# Patient Record
Sex: FEMALE | Race: WHITE | Employment: FULL TIME | URBAN - METROPOLITAN AREA
[De-identification: names, ages, dates, MRNs, and addresses within clinical notes are randomized per-mention and may not be internally consistent; named-entity substitution may affect disease eponyms.]

---

## 2017-02-08 ENCOUNTER — HOSPITAL ENCOUNTER (OUTPATIENT)
Dept: OBGYN | Age: 27
Discharge: HOME | End: 2017-02-28
Attending: OBSTETRICS & GYNECOLOGY | Admitting: OBSTETRICS & GYNECOLOGY

## 2017-03-01 ENCOUNTER — HOSPITAL ENCOUNTER (OUTPATIENT)
Dept: OTHER | Age: 27
Discharge: HOME | End: 2017-03-31

## 2017-03-13 ENCOUNTER — HOSPITAL ENCOUNTER (OUTPATIENT)
Dept: OBGYN | Age: 27
Discharge: HOME | End: 2017-03-31
Admitting: OBSTETRICS & GYNECOLOGY

## 2017-03-28 PROBLEM — O47.9 THREATENED LABOR AT TERM: Status: ACTIVE | Noted: 2017-03-28

## 2017-09-25 ENCOUNTER — OFFICE VISIT (OUTPATIENT)
Dept: BARIATRICS/WEIGHT MGMT | Age: 27
End: 2017-09-25

## 2017-09-25 VITALS
HEART RATE: 76 BPM | HEIGHT: 61 IN | DIASTOLIC BLOOD PRESSURE: 82 MMHG | BODY MASS INDEX: 38.71 KG/M2 | SYSTOLIC BLOOD PRESSURE: 114 MMHG | WEIGHT: 205 LBS

## 2017-09-25 DIAGNOSIS — E66.01 MORBID OBESITY WITH BMI OF 40.0-44.9, ADULT (HCC): Primary | ICD-10-CM

## 2017-09-25 DIAGNOSIS — Z79.899 HIGH RISK MEDICATIONS (NOT ANTICOAGULANTS) LONG-TERM USE: ICD-10-CM

## 2017-09-25 PROCEDURE — 99214 OFFICE O/P EST MOD 30 MIN: CPT | Performed by: FAMILY MEDICINE

## 2017-09-25 PROCEDURE — 93000 ELECTROCARDIOGRAM COMPLETE: CPT | Performed by: FAMILY MEDICINE

## 2017-09-25 RX ORDER — MEDROXYPROGESTERONE ACETATE 10 MG/1
TABLET ORAL
COMMUNITY
Start: 2017-09-06 | End: 2017-09-25

## 2017-09-25 RX ORDER — FLUCONAZOLE 150 MG/1
TABLET ORAL
COMMUNITY
Start: 2017-06-30 | End: 2017-09-25

## 2017-09-25 RX ORDER — VITAMIN A ACETATE, .BETA.-CAROTENE, ASCORBIC ACID, CHOLECALCIFEROL, .ALPHA.-TOCOPHEROL ACETATE, DL-, THIAMINE MONONITRATE, RIBOFLAVIN, NIACINAMIDE, PYRIDOXINE HYDROCHLORIDE, FOLIC ACID, CYANOCOBALAMIN, CALCIUM CARBONATE, FERROUS FUMARATE, ZINC OXIDE, AND CUPRIC OXIDE 2000; 2000; 120; 400; 22; 1.84; 3; 20; 10; 1; 12; 200; 27; 25; 2 [IU]/1; [IU]/1; MG/1; [IU]/1; MG/1; MG/1; MG/1; MG/1; MG/1; MG/1; UG/1; MG/1; MG/1; MG/1; MG/1
TABLET ORAL
COMMUNITY
Start: 2017-07-13

## 2017-09-25 ASSESSMENT — PATIENT HEALTH QUESTIONNAIRE - PHQ9
1. LITTLE INTEREST OR PLEASURE IN DOING THINGS: 0
SUM OF ALL RESPONSES TO PHQ9 QUESTIONS 1 & 2: 0
2. FEELING DOWN, DEPRESSED OR HOPELESS: 0
SUM OF ALL RESPONSES TO PHQ QUESTIONS 1-9: 0

## 2017-09-25 ASSESSMENT — ENCOUNTER SYMPTOMS
GASTROINTESTINAL NEGATIVE: 1
RESPIRATORY NEGATIVE: 1
EYES NEGATIVE: 1

## 2017-10-05 ENCOUNTER — OFFICE VISIT (OUTPATIENT)
Dept: BARIATRICS/WEIGHT MGMT | Age: 27
End: 2017-10-05

## 2017-10-05 VITALS
DIASTOLIC BLOOD PRESSURE: 80 MMHG | SYSTOLIC BLOOD PRESSURE: 116 MMHG | WEIGHT: 203 LBS | BODY MASS INDEX: 38.33 KG/M2 | HEIGHT: 61 IN | HEART RATE: 80 BPM | RESPIRATION RATE: 16 BRPM

## 2017-10-05 DIAGNOSIS — E55.9 VITAMIN D DEFICIENCY: ICD-10-CM

## 2017-10-05 DIAGNOSIS — E78.2 MIXED HYPERLIPIDEMIA: ICD-10-CM

## 2017-10-05 DIAGNOSIS — E66.9 OBESITY (BMI 30-39.9): Primary | ICD-10-CM

## 2017-10-05 PROCEDURE — 99213 OFFICE O/P EST LOW 20 MIN: CPT | Performed by: NURSE PRACTITIONER

## 2017-10-05 ASSESSMENT — ENCOUNTER SYMPTOMS
GASTROINTESTINAL NEGATIVE: 1
RESPIRATORY NEGATIVE: 1
EYES NEGATIVE: 1

## 2017-10-05 NOTE — PROGRESS NOTES
[facility]) Physicians   [facility] [employer]    Patient: Luis Clancy                      Encounter Date: 10/5/2017    YOB: 1990               Age: 32 y.o. Chief Complaint   Patient presents with    Obesity     13th MWM, restart        /80  Pulse 80  Resp 16  Ht 5' 1\" (1.549 m)  Wt 203 lb (92.1 kg)  LMP 09/19/2017  Breastfeeding? No  BMI 38.36 kg/m2    Body mass index is 38.36 kg/(m^2). HPI: 32 y.o. female with a long-standing history of obesity presents today for follow-up. she has lost 2 pounds since her last visit . Current treatment includes 1200 calorie LCHF diet and exercise. Met with the dietitian today. Plan reviewed in detail with the patient. She was previously on Qsymia (over a year ago) and is interested in starting this again. She did have a baby 6 months ago, but is no longer breastfeeding. Diet: [x]LCHF/Ketogenic   []Modified low-calorie diet  [x]Low-calorie diet          []Maintenance       []Other:         Adherent? [x]Yes     []No     [x]Patient denies history/family history of thyroid cancer  [x]Patient denies history of heart disease  [x]Patient denies history of glaucoma  [x]Patient denies recent MAOI use  [x]Patient denies being currently pregnant or breastfeeding. Patient understands it is her responsibility to obtain negative pregnancy tests on a monthly basis.   [x] Patient denies use of weight loss medications within the past 6 months  [x] BMI greater than 30, or greater than 27 with co-morbidities    [x] OARRS reviewed prior to initiating anti-obesity medication   [x]EKG reviewed by MD prior to initiation of anti-obesity medication      Exercise: [x]Cardio - walking at lunch and joined cardio class at work    []Resistance/strength training     []Other    Allergies   Allergen Reactions    Penicillins      Unsure of reaction to pcn    Eggs Or Egg-Derived Products Rash     03/20/2017 -  02/09/2017 -  11/09/2016 -  09/12/2016 -      Lac Bovis Rash     03/20/2017 -  02/09/2017 -  11/09/2016 -  09/12/2016 -      Peanut-Containing Drug Products Rash     03/20/2017 -  02/09/2017 -  11/09/2016 -  09/12/2016 -           Current Outpatient Prescriptions:     Prenatal Vit-Fe Fumarate-FA (PNV PRENATAL PLUS MULTIVITAMIN) 27-1 MG TABS, , Disp: , Rfl:     Patient Active Problem List   Diagnosis    Screening for malignant neoplasm of cervix    Vaginal discharge    Pelvic pain in female    PCOS (polycystic ovarian syndrome)    Obesity    Obesity (BMI 30-39. 9)    Threatened labor at term    Forceps delivery    Vitamin D deficiency       Review of Systems   Constitutional: Negative. HENT: Negative. Eyes: Negative. Respiratory: Negative. Cardiovascular: Negative. Gastrointestinal: Negative. Skin: Negative. Neurological: Negative. Physical Exam   Constitutional: She is oriented to person, place, and time. She appears well-developed and well-nourished. HENT:   Head: Normocephalic and atraumatic. Eyes: Pupils are equal, round, and reactive to light. Neck: Normal range of motion. Cardiovascular: Normal rate, regular rhythm and normal heart sounds. Pulmonary/Chest: Effort normal.   Musculoskeletal: Normal range of motion. Neurological: She is alert and oriented to person, place, and time. Psychiatric: She has a normal mood and affect. Her behavior is normal. Judgment and thought content normal.         Assessment and Plan:    Jack Hurtado is following 1200 calorie LCF meal plan with no side effects. We discussed treatment options and she elects to start Qsymia today. We will start her on the Qsymia trial dose today. She will take daily in the morning with a large glass of water. She will follow up in 2 weeks. If she tolerates the trial dose well, we will increase to therapeutic dose at 2 week follow up. She was advised to contact us if any side effects occur.  We will also sign her up for the [facility] today to start the

## 2017-10-19 ENCOUNTER — OFFICE VISIT (OUTPATIENT)
Dept: BARIATRICS/WEIGHT MGMT | Age: 27
End: 2017-10-19

## 2017-10-19 VITALS
WEIGHT: 202 LBS | SYSTOLIC BLOOD PRESSURE: 115 MMHG | HEART RATE: 77 BPM | DIASTOLIC BLOOD PRESSURE: 79 MMHG | BODY MASS INDEX: 38.14 KG/M2 | HEIGHT: 61 IN

## 2017-10-19 DIAGNOSIS — E66.9 OBESITY (BMI 30-39.9): Primary | ICD-10-CM

## 2017-10-19 PROCEDURE — 99213 OFFICE O/P EST LOW 20 MIN: CPT | Performed by: NURSE PRACTITIONER

## 2017-10-19 ASSESSMENT — ENCOUNTER SYMPTOMS
EYES NEGATIVE: 1
RESPIRATORY NEGATIVE: 1
GASTROINTESTINAL NEGATIVE: 1

## 2017-10-19 NOTE — PATIENT INSTRUCTIONS
Key Low Carb, High Healthy Fat Dietary Points:    - Meats (preferably organic or grass fed) are great sources of protein and are low in carbohydrates. [place] with coconut, olive, avocado, or almond oils. - When buying dairy, choose regular or full fat options. - Choose vegetables that grow above ground as they are generally lower in carbohydrates. - Avoid bread, rice, potatoes, pasta and all sources of simple sugars (desserts, soda, breakfast cereals). - Choose beverages that are calorie and sugar free, such as water or flavored ambriz. - When eating fruit, choose berries as a snack option a few times a week. Reminder regarding weight loss medications: You must be seen in office every 2-4 weeks to have your prescriptions refilled. If you are off of your medication for longer than 7 days, you will not be able to restart the medication for at least 6 months. Always call our office to report any side effects you may be having. Females, it is your responsibility to obtain negative pregnancy tests each month.

## 2017-11-13 ENCOUNTER — OFFICE VISIT (OUTPATIENT)
Dept: BARIATRICS/WEIGHT MGMT | Age: 27
End: 2017-11-13

## 2017-11-13 VITALS
WEIGHT: 197 LBS | HEIGHT: 61 IN | HEART RATE: 84 BPM | SYSTOLIC BLOOD PRESSURE: 118 MMHG | DIASTOLIC BLOOD PRESSURE: 80 MMHG | BODY MASS INDEX: 37.19 KG/M2

## 2017-11-13 DIAGNOSIS — Z71.3 DIETARY COUNSELING AND SURVEILLANCE: ICD-10-CM

## 2017-11-13 DIAGNOSIS — E66.9 CLASS 2 OBESITY: Primary | ICD-10-CM

## 2017-11-13 PROCEDURE — 99213 OFFICE O/P EST LOW 20 MIN: CPT | Performed by: FAMILY MEDICINE

## 2017-11-13 ASSESSMENT — ENCOUNTER SYMPTOMS
GASTROINTESTINAL NEGATIVE: 1
EYES NEGATIVE: 1
RESPIRATORY NEGATIVE: 1

## 2017-11-13 NOTE — PATIENT INSTRUCTIONS
Patient Education        Learning About Obesity  What is obesity? Obesity means having so much body fat that your health is in danger. Having too much body fat can lead to type 2 diabetes, heart disease, high blood pressure, arthritis, sleep apnea, and stroke. Even if you don't feel bad now, think about these health risks. Do they seem like a good reason to start on a new path toward a healthier weight? Or do you have another personal, powerful reason for wanting to lose weight? Whatever it is, keep it in mind. It can be hard to change eating habits and exercise habits. But with your own reason and plan, you can do it. How do you know if your weight is in the obesity range? To know if your weight is in the obesity range, your doctor looks at your body mass index (BMI) and waist size. Your BMI is a number that is calculated from your weight and your height. To figure your BMI for yourself, get a BMI table from your doctor or use an online tool, such as http://www.flores.com/ on the [employer]. What causes obesity? When you take in more calories than you burn off, you gain weight. How you eat, how active you are, and other things affect how your body uses calories and whether you gain weight. If you have family members who have too much body fat, you may have inherited a tendency to gain weight. And your family also helps form your eating and lifestyle habits, which can lead to obesity. Also, our busy lives make it harder to plan and cook healthy meals. For many of us, it's easier to reach for prepared foods, go out to eat, or go to the drive-through. But these foods are often high in saturated fat and calories. Portions are often too large. What can you do to reach a healthy weight? Focus on health, not diets. Diets are hard to stay on and don't work in the long run.  It is very hard to stay with a diet that includes lots of big changes in your eating habits. Instead of a diet, focus on lifestyle changes that will improve your health and achieve the right balance of energy and calories. To lose weight, you need to burn more calories than you take in. You can do it by eating healthy foods in reasonable amounts and becoming more active, even a little bit every day. Making small changes over time can add up to a lot. Make a plan for change. Many people have found that naming their reasons for change and staying focused on their plan can make a big difference. Work with your doctor to create a plan that is right for you. · Ask yourself: Dat Ohms are my personal, most powerful reasons for wanting this change? What will my life look like when I've made the change? \"  · Set your long-term goal. Make it specific, such as \"I will lose x pounds. \"  · Break your long-term goal into smaller, short-term goals. Make these small steps specific and within your reach, things you know you can do. These steps are what keep you going from day to day. How can you stay on your plan for change? Be ready. Choose to start during a time when there are few events that might trigger slip-ups, like holidays, social events, and high-stress periods. Decide on your first few steps. Most people have more success when they make small changes, one step at a time. For example, you might switch a daily candy bar to a piece of fruit, walk 10 minutes more, or add more vegetables to a meal.  Line up your support people. Make sure you're not going to be alone as you make this change. Connect with people who understand how important it is to you. Ask family members and friends for help in keeping with your plan. And think about who could make it harder for you, and how to handle them. Try tracking. People who keep track of what they eat, feel, and do are better at losing weight. Try writing down things like:  · What and how much you eat.   · How you feel before and after each meal.  · Details about each meal (like eating out or at home, eating alone, or with friends or family). · What you do to be active. Look and plan. As you track, look for patterns that you may want to change. Take note of:  · When you eat and whether you skip meals. · How often you eat out. · How many fruits and vegetables you eat. · When you eat beyond feeling full. · When and why you eat for reasons other than being hungry. When you stray from your plan, don't get upset. Figure out what made you slip up and how you can fix it. Can you take medicines or have surgery to lose weight? Before your doctor will prescribe medicines or surgery, he or she will probably want you to be more active and follow your healthy eating plan for a period of time. These habits are key lifelong changes for managing your weight, with or without other medical treatment. And these changes can help you avoid weight-related health problems. Follow-up care is a key part of your treatment and safety. Be sure to make and go to all appointments, and call your doctor if you are having problems. It's also a good idea to know your test results and keep a list of the medicines you take. Where can you learn more? Go to https://SOL ELIXIRSpeNamshi.Stylyt. org and sign in to your [employer] account. Enter N111 in the [employer] box to learn more about \"Learning About Obesity. \"     If you do not have an account, please click on the \"Sign Up Now\" link. Current as of: October 13, 2016  Content Version: 11.3  © 5152-6971 [employer]. Care instructions adapted under license by [facility] ([facility]). If you have questions about a medical condition or this instruction, always ask your healthcare professional. [address] any warranty or liability for your use of this information.

## 2017-11-13 NOTE — PROGRESS NOTES
pain in female    PCOS (polycystic ovarian syndrome)    Obesity    Obesity (BMI 30-39. 9)    Threatened labor at term    Forceps delivery    Vitamin D deficiency    Mixed hyperlipidemia       Review of Systems   HENT: Negative. Eyes: Negative. Respiratory: Negative. Cardiovascular: Negative. Gastrointestinal: Negative. Endocrine: Negative. Musculoskeletal: Negative. Neurological: Negative. Psychiatric/Behavioral: Negative. Physical Exam   Constitutional: She is oriented to person, place, and time. She appears well-developed and well-nourished. Eyes: Conjunctivae and EOM are normal.   Cardiovascular: Normal rate and normal heart sounds. Exam reveals no gallop and no friction rub. No murmur heard. Pulmonary/Chest: Effort normal and breath sounds normal. No respiratory distress. She has no wheezes. She has no rales. Musculoskeletal: She exhibits no edema. Neurological: She is alert and oriented to person, place, and time. Skin: Skin is warm and dry. Psychiatric: She has a normal mood and affect.  Her behavior is normal. Judgment and thought content normal.       Admission on 03/28/2017, Discharged on 03/30/2017   Component Date Value Ref Range Status    WBC 03/28/2017 13.4* 4.0 - 11.0 K/uL Final    RBC 03/28/2017 4.18  4.00 - 5.20 M/uL Final    Hemoglobin 03/28/2017 12.6  12.0 - 16.0 g/dL Final    Hematocrit 03/28/2017 37.2  36.0 - 48.0 % Final    MCV 03/28/2017 89.0  80.0 - 100.0 fL Final    MCH 03/28/2017 30.2  26.0 - 34.0 pg Final    MCHC 03/28/2017 33.9  31.0 - 36.0 g/dL Final    RDW 03/28/2017 15.5* 12.4 - 15.4 % Final    Platelets 57/79/8180 301  135 - 450 K/uL Final    MPV 03/28/2017 8.5  5.0 - 10.5 fL Final    Neutrophils % 03/28/2017 84.1  % Final    Lymphocytes % 03/28/2017 9.8  % Final    Monocytes % 03/28/2017 4.7  % Final    Eosinophils % 03/28/2017 0.8  % Final    Basophils % 03/28/2017 0.6  % Final    Neutrophils # 03/28/2017 11.2* 1.7 - 7.7 K/uL Final    Lymphocytes # 03/28/2017 1.3  1.0 - 5.1 K/uL Final    Monocytes # 03/28/2017 0.6  0.0 - 1.3 K/uL Final    Eosinophils # 03/28/2017 0.1  0.0 - 0.6 K/uL Final    Basophils # 03/28/2017 0.1  0.0 - 0.2 K/uL Final    ABO/Rh 03/28/2017 B POS   Final    Antibody Screen 03/28/2017 NEG   Final    RPR 03/29/2017 Non-reactive  Non-reactive Final    Amphetamine Screen, Urine 03/28/2017 Neg  Negative <1000ng/mL Final    Barbiturate Screen, Ur 03/28/2017 Neg  Negative <200 ng/mL Final    Benzodiazepine Screen, Urine 03/28/2017 Neg  Negative <200 ng/mL Final    Cannabinoid Scrn, Ur 03/28/2017 Neg  Negative <50 ng/mL Final    COCAINE METABOLITE SCREEN URINE 03/28/2017 Neg  Negative <300 ng/mL Final    Opiate Scrn, Ur 03/28/2017 Neg  Negative <300 ng/mL Final    Comment: \"Therapeutic levels of pain medication, especially oxycontin and synthetic  opioids, may not be detected by this Methodology. Pain management screen  panel  Drug panel-PM-Hi Res Ur, Interp (PAIN) should be considered for drug  monitoring \".  PCP Scrn, Ur 03/28/2017 Neg  Negative <25 ng/mL Final    Methadone Screen, Urine 03/28/2017 Neg  Negative <300 ng/mL Final    Propoxyphene Scrn, Ur 03/28/2017 Neg  Negative <300 ng/mL Final    pH, UA 03/28/2017 7.0   Final    Comment: Urine pH less than 5.0 or greater than 8.0 may indicate sample adulteration. Another sample should be collected if clinically  indicated.  Drug Screen Comment: 03/28/2017 see below   Final    Comment: This method is a screening test to detect only these drug  classes as part of a medical workup. Confirmatory testing  by another method should be ordered if clinically indicated.       Oxycodone Urine 03/28/2017 Neg  Negative <100 ng/ml Final    Antibody Screen 09/12/2016 negativre   Final    ABO/Rh 09/12/2016 B positive   Final    Hematocrit 09/12/2016 35.9* 36 - 46 % Final    HCT 12/22/2016 32.5  % Final    Hemoglobin 09/12/2016 10.9* 12.0 - 16.0 carbohydrate sources  [x] Alcohol use  [x] Tobacco use   [x] Drug use- Denies  [x] Importance of exercise and reducing sedentary time  [x] Treatment consent- Patient understands and agrees with the treatment plan   [x] Proper use of medication and side effects  [x] OARRS report 10/19/2017 1  QSYMIA 7.5 MG-46 MG CAPSULE 30 30 AN SAVITA   [x] Labs  [x] EKG     Patient denies any history of cardiovascular disease (e.g., CAD, stroke, arrhythmias, CHF, uncontrolled HTN), seizure disorder, MAOI use within the last 2 weeks, hyperthyroidism, glaucoma, agitated states, history of drug abuse, pregnancy, nursing, known hypersensitivity to the prescribing meds, history of pancreatitis, or personal or family history of thyroid medullary cancer. Treatment start date: 10/6/17  Starting Weight: 203 pounds   12 week goal: 10 pounds by 1/6/18  Total weight loss: 6 pounds    Key dietary points:    - Meats (preferably organic or grass fed) are great sources of protein and have no carbohydrates. - Recommend coconut, olive, avocado, or almond oils. - When buying dairy, choose regular or full fat options. - Choose vegetables that grow above ground as they are generally lower in carbohydrates and higher in fiber.  - Avoid starches such as bread, rice, potatoes, pasta and all sources of simple sugars (desserts, soda, breakfast cereals). - Choose beverages that are calorie and sugar free. Reminder regarding weight loss medications: You must be seen in office every 2-4 weeks to have your prescriptions refilled. If you are off of your medication for longer than 7 days, you will not be able to restart the medication for at least 6 months. Always call our office to report any side effects. Females, it is your responsibility to obtain negative pregnancy tests each month. No orders of the defined types were placed in this encounter. No Follow-up on file.         This dictation was performed with a verbal recognition program

## 2017-12-14 ENCOUNTER — OFFICE VISIT (OUTPATIENT)
Dept: BARIATRICS/WEIGHT MGMT | Age: 27
End: 2017-12-14

## 2017-12-14 VITALS
HEART RATE: 85 BPM | WEIGHT: 191 LBS | HEIGHT: 61 IN | BODY MASS INDEX: 36.06 KG/M2 | SYSTOLIC BLOOD PRESSURE: 111 MMHG | DIASTOLIC BLOOD PRESSURE: 70 MMHG

## 2017-12-14 DIAGNOSIS — E66.9 OBESITY (BMI 30-39.9): Primary | ICD-10-CM

## 2017-12-14 PROCEDURE — 99213 OFFICE O/P EST LOW 20 MIN: CPT | Performed by: NURSE PRACTITIONER

## 2017-12-14 ASSESSMENT — ENCOUNTER SYMPTOMS
GASTROINTESTINAL NEGATIVE: 1
RESPIRATORY NEGATIVE: 1
EYES NEGATIVE: 1

## 2017-12-14 NOTE — PATIENT INSTRUCTIONS
Key Low Carb, High Healthy Fat Dietary Points:    - Meats (preferably organic or grass fed) are great sources of protein and are low in carbohydrates. [place] with coconut, olive, avocado, or almond oils. - When buying dairy, choose regular or full fat options. - Choose vegetables that grow above ground as they are generally lower in carbohydrates. - Avoid bread, rice, potatoes, pasta and all sources of simple sugars (desserts, soda, breakfast cereals). - Choose beverages that are calorie and sugar free, such as water or flavored ambriz. - When eating fruit, choose berries as a snack option a few times a week. Reminder regarding weight loss medications: You must be seen in office every 2-4 weeks to have your prescriptions refilled. If you are off of your medication for longer than 7 days, you will not be able to restart the medication for at least 6 months. Always call our office to report any side effects you may be having. Females, it is your responsibility to obtain negative pregnancy tests each month.

## 2017-12-14 NOTE — PROGRESS NOTES
11/15/17  [x]EKG reviewed by MD prior to initiation of anti-obesity medication      Exercise: [x]Cardio- Began 2 times a week for 15 minutes. []Resistance/strength training     []Walking    [] None    Allergies   Allergen Reactions    Penicillins      Unsure of reaction to pcn    Eggs Or Egg-Derived Products Rash     03/20/2017 -  02/09/2017 -  11/09/2016 -  09/12/2016 -      Lac Bovis Rash     03/20/2017 -  02/09/2017 -  11/09/2016 -  09/12/2016 -      Peanut-Containing Drug Products Rash     03/20/2017 -  02/09/2017 -  11/09/2016 -  09/12/2016 -           Current Outpatient Prescriptions:     norethindrone-ethinyl estradiol (ORTHO-NOVUM 1-35 TAB;NORTREL 1-35 TAB) 1-35 MG-MCG per tablet, Take 3 tablets po today, 2 tablets po tomorrow and then take 1 tablet po daily. , Disp: , Rfl:     Prenatal Vit-Fe Fumarate-FA (PNV PRENATAL PLUS MULTIVITAMIN) 27-1 MG TABS, , Disp: , Rfl:     Patient Active Problem List   Diagnosis    Screening for malignant neoplasm of cervix    Vaginal discharge    Pelvic pain in female    PCOS (polycystic ovarian syndrome)    Obesity    Obesity (BMI 30-39. 9)    Threatened labor at term    Forceps delivery    Vitamin D deficiency    Mixed hyperlipidemia       Review of Systems   Constitutional: Negative. HENT: Negative. Eyes: Negative. Respiratory: Negative. Cardiovascular: Negative. Gastrointestinal: Negative. Skin: Negative. Neurological: Negative. Physical Exam   Constitutional: She is oriented to person, place, and time. She appears well-developed and well-nourished. HENT:   Head: Normocephalic and atraumatic. Eyes: Pupils are equal, round, and reactive to light. Neck: Normal range of motion. Cardiovascular: Normal rate, regular rhythm and normal heart sounds. Pulmonary/Chest: Effort normal.   Musculoskeletal: Normal range of motion. Neurological: She is alert and oriented to person, place, and time.    Psychiatric: She has a normal mood and affect. Her behavior is normal. Judgment and thought content normal.         Assessment and Plan:    Deb Covington is using Qsymia with no side effects other than the few episodes of tingling in her fingers and toes. She will focus on fluid intake (goal of 64 ounces a day), to prevent the tingling in her fingers and toes. Patient is feeling satisfactory appetite suppression from the medication. Patient is following the dietary guidelines well. We will continue current treatment plan. She will focus on adequate protein at breakfast and lunch, and ensure she is getting 1200 calories a day. She was congratulated on her exercise and she will continue this. Refill provided. Patient aware of potential side effects of medications, she understands responsibility to ensure monthly negative pregnancy tests. The patient understands it is her responsibility to follow up with the provider every 4 weeks while on this treatment program and failure to do so will result in being taken off the above treatment. The patient also understands that if they are off of the medication for 7 days, the medication will be discontinued and cannot be restarted for six months. The patient understands they are not to drink alcohol while on this medication. The patient understands that a 5% weight loss goal has been set for this 12 weeks of treatment and failure to meet this goal will result in changing the medication dosage or being taken off of the medication. Patient denies any history of cardiovascular disease (e.g., CAD, stroke, arrhythmias, CHF, uncontrolled HTN), MAOI use within the last 2 weeks, hyperthyroidism, glaucoma, agitated staes, history of drug abuse, pregnancy, nursing, known hypersensitivity to the sympathomimetic amines. The patient underwent dietary counseling with registered dietician. I have reviewed, discussed and agree with the dietary plan.     Treatment start date: 10/6/17  Starting Weight (for this 12

## 2018-01-15 ENCOUNTER — OFFICE VISIT (OUTPATIENT)
Dept: BARIATRICS/WEIGHT MGMT | Age: 28
End: 2018-01-15

## 2018-01-15 VITALS
HEIGHT: 61 IN | WEIGHT: 190 LBS | DIASTOLIC BLOOD PRESSURE: 70 MMHG | BODY MASS INDEX: 35.87 KG/M2 | HEART RATE: 88 BPM | SYSTOLIC BLOOD PRESSURE: 106 MMHG

## 2018-01-15 DIAGNOSIS — E66.9 CLASS 2 OBESITY: Primary | ICD-10-CM

## 2018-01-15 DIAGNOSIS — Z71.3 DIETARY COUNSELING AND SURVEILLANCE: ICD-10-CM

## 2018-01-15 PROCEDURE — 99213 OFFICE O/P EST LOW 20 MIN: CPT | Performed by: FAMILY MEDICINE

## 2018-01-15 ASSESSMENT — ENCOUNTER SYMPTOMS
EYES NEGATIVE: 1
RESPIRATORY NEGATIVE: 1
GASTROINTESTINAL NEGATIVE: 1

## 2018-01-15 NOTE — PATIENT INSTRUCTIONS
changes in your eating habits. Instead of a diet, focus on lifestyle changes that will improve your health and achieve the right balance of energy and calories. To lose weight, you need to burn more calories than you take in. You can do it by eating healthy foods in reasonable amounts and becoming more active, even a little bit every day. Making small changes over time can add up to a lot. Make a plan for change. Many people have found that naming their reasons for change and staying focused on their plan can make a big difference. Work with your doctor to create a plan that is right for you. · Ask yourself: Toribio Whitt are my personal, most powerful reasons for wanting this change? What will my life look like when I've made the change? \"  · Set your long-term goal. Make it specific, such as \"I will lose x pounds. \"  · Break your long-term goal into smaller, short-term goals. Make these small steps specific and within your reach, things you know you can do. These steps are what keep you going from day to day. How can you stay on your plan for change? Be ready. Choose to start during a time when there are few events that might trigger slip-ups, like holidays, social events, and high-stress periods. Decide on your first few steps. Most people have more success when they make small changes, one step at a time. For example, you might switch a daily candy bar to a piece of fruit, walk 10 minutes more, or add more vegetables to a meal.  Line up your support people. Make sure you're not going to be alone as you make this change. Connect with people who understand how important it is to you. Ask family members and friends for help in keeping with your plan. And think about who could make it harder for you, and how to handle them. Try tracking. People who keep track of what they eat, feel, and do are better at losing weight. Try writing down things like:  · What and how much you eat.   · How you feel before and after each meal.  · Details about each meal (like eating out or at home, eating alone, or with friends or family). · What you do to be active. Look and plan. As you track, look for patterns that you may want to change. Take note of:  · When you eat and whether you skip meals. · How often you eat out. · How many fruits and vegetables you eat. · When you eat beyond feeling full. · When and why you eat for reasons other than being hungry. When you stray from your plan, don't get upset. Figure out what made you slip up and how you can fix it. Can you take medicines or have surgery to lose weight? Before your doctor will prescribe medicines or surgery, he or she will probably want you to be more active and follow your healthy eating plan for a period of time. These habits are key lifelong changes for managing your weight, with or without other medical treatment. And these changes can help you avoid weight-related health problems. Follow-up care is a key part of your treatment and safety. Be sure to make and go to all appointments, and call your doctor if you are having problems. It's also a good idea to know your test results and keep a list of the medicines you take. Where can you learn more? Go to https://[email or web address]. org and sign in to your micecloud account. Enter N111 in the [employer] Mail box to learn more about \"Learning About Obesity. \"     If you do not have an account, please click on the \"Sign Up Now\" link. Current as of: October 13, 2016  Content Version: 11.5  © 3584-2564 Healthwise, Incorporated. Care instructions adapted under license by [facility] ([facility]). If you have questions about a medical condition or this instruction, always ask your healthcare professional. [address] any warranty or liability for your use of this information.

## 2018-01-15 NOTE — PROGRESS NOTES
effects. Females, it is your responsibility to obtain negative pregnancy tests each month. No orders of the defined types were placed in this encounter. No Follow-up on file. Greater than 50% of this 20 minute visit was used in direct counseling. This dictation was performed with a verbal recognition program (Dragon) and all efforts were made to ensure accuracy of this dictation. It is possible that there are still dictated errors within this note. If so, please bring any errors to my attention for correction.

## 2018-02-12 ENCOUNTER — OFFICE VISIT (OUTPATIENT)
Dept: BARIATRICS/WEIGHT MGMT | Age: 28
End: 2018-02-12

## 2018-02-12 VITALS
SYSTOLIC BLOOD PRESSURE: 116 MMHG | BODY MASS INDEX: 34.55 KG/M2 | HEART RATE: 70 BPM | RESPIRATION RATE: 16 BRPM | WEIGHT: 183 LBS | DIASTOLIC BLOOD PRESSURE: 74 MMHG | HEIGHT: 61 IN

## 2018-02-12 DIAGNOSIS — E28.2 PCOS (POLYCYSTIC OVARIAN SYNDROME): ICD-10-CM

## 2018-02-12 DIAGNOSIS — E66.9 OBESITY (BMI 30.0-34.9): ICD-10-CM

## 2018-02-12 DIAGNOSIS — E78.2 MIXED HYPERLIPIDEMIA: Primary | ICD-10-CM

## 2018-02-12 PROCEDURE — 99213 OFFICE O/P EST LOW 20 MIN: CPT | Performed by: NURSE PRACTITIONER

## 2018-02-12 ASSESSMENT — ENCOUNTER SYMPTOMS
ALLERGIC/IMMUNOLOGIC NEGATIVE: 1
EYES NEGATIVE: 1
GASTROINTESTINAL NEGATIVE: 1
RESPIRATORY NEGATIVE: 1

## 2018-02-12 NOTE — PROGRESS NOTES
[facility] ([facility]) Physicians   Weight Management [facility]    Medical Weight Loss    HPI: Elina Bradley is a very pleasant 29 y.o. female with Body mass index is 34.58 kg/m². Here for medical weight loss. Patient denies any nausea, vomiting, fevers, chills, shortness of breath, chest pain, cough, constipation or difficulty urinating. Past Medical History:   Diagnosis Date    Anxiety     Depression     Hyperlipidemia     Infertility, female     iui    PCOS (polycystic ovarian syndrome)      Past Surgical History:   Procedure Laterality Date    ADENOIDECTOMY      had them removed twice as they grew back at age 16    BACK SURGERY      x 2    Donny Forno 76      as a child     Family History   Problem Relation Age of Onset    High Blood Pressure Mother     Migraines Mother     Depression Mother     COPD Mother     Depression Sister     Glaucoma Maternal Aunt     High Blood Pressure Maternal Grandmother     High Cholesterol Maternal Grandmother     Stroke Maternal Grandmother     Kidney Disease Maternal Grandmother     Arthritis Maternal Grandmother     High Blood Pressure Maternal Grandfather     High Cholesterol Maternal Grandfather     Stroke Maternal Grandfather     Diabetes Maternal Grandfather     Arthritis Maternal Grandfather     Glaucoma Maternal Grandfather      Social History   Substance Use Topics    Smoking status: Former Smoker    Smokeless tobacco: Never Used    Alcohol use No     I counseled the patient on the importance of not smoking and risks of ETOH.    Allergies   Allergen Reactions    Penicillins      Unsure of reaction to pcn    Eggs Or Egg-Derived Products Rash     03/20/2017 -  02/09/2017 -  11/09/2016 -  09/12/2016 -      Lac Bovis Rash     03/20/2017 -  02/09/2017 -  11/09/2016 -  09/12/2016 -      Peanut-Containing Drug Products Rash     03/20/2017 -  02/09/2017 -  11/09/2016 -  09/12/2016 -       Vitals:    02/12/18 1529   BP: 116/74   Pulse: 70

## 2018-03-12 ENCOUNTER — OFFICE VISIT (OUTPATIENT)
Dept: BARIATRICS/WEIGHT MGMT | Age: 28
End: 2018-03-12

## 2018-03-12 VITALS
SYSTOLIC BLOOD PRESSURE: 114 MMHG | HEIGHT: 61 IN | HEART RATE: 86 BPM | RESPIRATION RATE: 16 BRPM | DIASTOLIC BLOOD PRESSURE: 76 MMHG | WEIGHT: 178 LBS | BODY MASS INDEX: 33.61 KG/M2

## 2018-03-12 DIAGNOSIS — E78.2 MIXED HYPERLIPIDEMIA: ICD-10-CM

## 2018-03-12 DIAGNOSIS — E66.9 OBESITY (BMI 30.0-34.9): Primary | ICD-10-CM

## 2018-03-12 DIAGNOSIS — E28.2 PCOS (POLYCYSTIC OVARIAN SYNDROME): ICD-10-CM

## 2018-03-12 PROCEDURE — 99213 OFFICE O/P EST LOW 20 MIN: CPT | Performed by: NURSE PRACTITIONER

## 2018-03-12 ASSESSMENT — ENCOUNTER SYMPTOMS
RESPIRATORY NEGATIVE: 1
ALLERGIC/IMMUNOLOGIC NEGATIVE: 1
EYES NEGATIVE: 1
GASTROINTESTINAL NEGATIVE: 1

## 2018-03-12 NOTE — PATIENT INSTRUCTIONS
planning/prep  - Start tracking at least 3 days/week  - Increase exercise to 2 days/week 30-45 minutes

## 2018-03-12 NOTE — PROGRESS NOTES
discontinue it. she understands that abrupt cessation of this dose may cause adverse reactions including seizures. she understands that it is her responsibility to make sure that she does not run out of medications and to follow up to her appointments every 24 weeks as recommended. Heavily counseled on the importance of therapeutic lifestyle changes through diet and exercise. The patient's current weight is 178 lbs. The goal for the patient in the 12 weeks is a loss of 9.5 lbs by their April 2018 visit. Currently the patient has lost 12 lbs of their 12 week goal.     Discussed possible side effects including, but not limited to paresthesias, dizziness, dysgeusia, insomnia, constipation and dry mouth. Patient is responsible for keeping their monthly appointments. Failure to comply with their monthly visits will result in discontinuing the Qsymia. Also, discussed with patient to make sure they fill their prescription within at least 7 days of this appointment. We will see her back in 1 month for continued follow up. I have spent over 15 min counseling patient.

## 2018-03-13 ENCOUNTER — TELEPHONE (OUTPATIENT)
Dept: BARIATRICS/WEIGHT MGMT | Age: 28
End: 2018-03-13

## 2018-03-13 NOTE — TELEPHONE ENCOUNTER
Lm on pt vm about medication Qsymia, to call office back. Wanted to let pt know that her insurance denied the medication. Want to see if patient has been paying for qsymia out of pocket, if she knows that insurance is not paying for it.

## 2018-04-09 ENCOUNTER — OFFICE VISIT (OUTPATIENT)
Dept: BARIATRICS/WEIGHT MGMT | Age: 28
End: 2018-04-09

## 2018-04-09 VITALS
HEIGHT: 61 IN | WEIGHT: 176 LBS | HEART RATE: 88 BPM | BODY MASS INDEX: 33.23 KG/M2 | DIASTOLIC BLOOD PRESSURE: 82 MMHG | SYSTOLIC BLOOD PRESSURE: 126 MMHG

## 2018-04-09 DIAGNOSIS — E66.9 OBESITY (BMI 30.0-34.9): ICD-10-CM

## 2018-04-09 DIAGNOSIS — E66.9 CLASS 1 OBESITY: Primary | ICD-10-CM

## 2018-04-09 DIAGNOSIS — Z71.3 DIETARY COUNSELING AND SURVEILLANCE: ICD-10-CM

## 2018-04-09 PROCEDURE — 99213 OFFICE O/P EST LOW 20 MIN: CPT | Performed by: FAMILY MEDICINE

## 2018-04-09 ASSESSMENT — ENCOUNTER SYMPTOMS
RESPIRATORY NEGATIVE: 1
EYES NEGATIVE: 1
GASTROINTESTINAL NEGATIVE: 1

## 2018-05-14 ENCOUNTER — OFFICE VISIT (OUTPATIENT)
Dept: BARIATRICS/WEIGHT MGMT | Age: 28
End: 2018-05-14

## 2018-05-14 VITALS
WEIGHT: 175 LBS | BODY MASS INDEX: 33.04 KG/M2 | HEART RATE: 72 BPM | SYSTOLIC BLOOD PRESSURE: 110 MMHG | HEIGHT: 61 IN | DIASTOLIC BLOOD PRESSURE: 74 MMHG

## 2018-05-14 DIAGNOSIS — Z71.3 DIETARY COUNSELING AND SURVEILLANCE: ICD-10-CM

## 2018-05-14 DIAGNOSIS — E66.9 CLASS 1 OBESITY: Primary | ICD-10-CM

## 2018-05-14 PROCEDURE — 99213 OFFICE O/P EST LOW 20 MIN: CPT | Performed by: FAMILY MEDICINE

## 2018-05-14 ASSESSMENT — ENCOUNTER SYMPTOMS
GASTROINTESTINAL NEGATIVE: 1
EYES NEGATIVE: 1
RESPIRATORY NEGATIVE: 1

## 2018-06-11 ENCOUNTER — OFFICE VISIT (OUTPATIENT)
Dept: BARIATRICS/WEIGHT MGMT | Age: 28
End: 2018-06-11

## 2018-06-11 VITALS
BODY MASS INDEX: 32 KG/M2 | SYSTOLIC BLOOD PRESSURE: 124 MMHG | HEIGHT: 61 IN | DIASTOLIC BLOOD PRESSURE: 80 MMHG | HEART RATE: 80 BPM | WEIGHT: 169.5 LBS

## 2018-06-11 DIAGNOSIS — E66.9 CLASS 1 OBESITY: Primary | ICD-10-CM

## 2018-06-11 DIAGNOSIS — Z71.3 DIETARY COUNSELING AND SURVEILLANCE: ICD-10-CM

## 2018-06-11 PROCEDURE — 99213 OFFICE O/P EST LOW 20 MIN: CPT | Performed by: FAMILY MEDICINE

## 2018-06-11 ASSESSMENT — ENCOUNTER SYMPTOMS
RESPIRATORY NEGATIVE: 1
EYES NEGATIVE: 1
GASTROINTESTINAL NEGATIVE: 1

## 2018-07-09 ENCOUNTER — OFFICE VISIT (OUTPATIENT)
Dept: BARIATRICS/WEIGHT MGMT | Age: 28
End: 2018-07-09

## 2018-07-09 VITALS
SYSTOLIC BLOOD PRESSURE: 118 MMHG | HEIGHT: 61 IN | BODY MASS INDEX: 31.53 KG/M2 | HEART RATE: 76 BPM | DIASTOLIC BLOOD PRESSURE: 80 MMHG | WEIGHT: 167 LBS

## 2018-07-09 DIAGNOSIS — Z71.3 DIETARY COUNSELING AND SURVEILLANCE: ICD-10-CM

## 2018-07-09 DIAGNOSIS — E66.9 CLASS 1 OBESITY: Primary | ICD-10-CM

## 2018-07-09 PROCEDURE — 99213 OFFICE O/P EST LOW 20 MIN: CPT | Performed by: FAMILY MEDICINE

## 2018-07-09 ASSESSMENT — ENCOUNTER SYMPTOMS
EYES NEGATIVE: 1
GASTROINTESTINAL NEGATIVE: 1
RESPIRATORY NEGATIVE: 1

## 2018-07-09 NOTE — PROGRESS NOTES
to Culture 05/25/2018 Yes   Final    Urine Type 05/25/2018 Not Specified   Final    hCG Qual 05/25/2018 Negative  Detects HCG level >10 MIU/mL Final    Mucus, UA 05/25/2018 2+* /LPF Final    WBC, UA 05/25/2018 6-10* 0 - 5 /HPF Final    RBC, UA 05/25/2018 3-5* 0 - 2 /HPF Final    Epi Cells 05/25/2018 5-10  /HPF Final    Bacteria, UA 05/25/2018 3+* /HPF Final    Urine Culture, Routine 05/25/2018 >50,000 CFU/ml mixed skin/urogenital arnoldo. No further workup   Final    Blood Culture, Routine 05/25/2018 No growth after 5 days of incubation. Final    Culture, Blood 2 05/25/2018 No growth after 5 days of incubation. Final    Lactic Acid 05/25/2018 1.2  0.4 - 2.0 mmol/L Final    Mono Test 05/25/2018 Negative  Negative Final         Assessment and Plan:    ICD-10-CM ICD-9-CM    1. Class 1 obesity E66.9 278.00 Improving. Start weaning off of Qsymia- take every other day x 2 weeks, then every 2 days until pills finished. Report any side effects. Continue to focus on lifestyle (low carb/eusebio diet and exercise). F/u prn. Phentermine-Topiramate (QSYMIA) 7.5-46 MG CP24   2. BMI 31.0-31.9,adult Z68.31 V85.31    3.  Dietary counseling and surveillance Z71.3 V65.3          Nutrition Plan: [] LCHF/Ketogenic   [x] Modified low-calorie diet (low carb/low-eusebio)               [] Low-calorie diet    [] Maintenance       []Other        Exercise: [x] Cardio     [x] Resistance/strength training                       [x] ACSM recommendations (150 minutes/week in active weight loss)                   [] Prevention of weight gain (150-250 minutes/week)           Behavior: [x] Motivational interviewing performed    [] Referral for counseling                         [x] Discussed strategies to overcome habits/challenges for focus         [] Stress management   [] Stimulus control         [] Sleep hygiene      Reviewed:  [x] Nutrition and the importance of regular protein intake  [x] Hidden carbohydrate sources  [x] Alcohol use  [x] Tobacco use   [x] Drug use- Denies  [x] Importance of exercise and reducing sedentary time  [x] Treatment consent- Patient understands and agrees with the treatment plan   [x] Proper use of medication and side effects  [x] OARRS report    Controlled Substances Monitoring:     RX Monitoring 7/9/2018   Attestation The Prescription Monitoring Report for this patient was reviewed today. Documentation Possible medication side effects, risk of tolerance/dependence & alternative treatments discussed. Patient denies any history of cardiovascular disease (e.g., CAD, stroke, arrhythmias, CHF, uncontrolled HTN), seizure disorder, MAOI use within the last 2 weeks, hyperthyroidism, glaucoma, agitated states, history of drug abuse, pregnancy, nursing, known hypersensitivity to the prescribing meds, history of pancreatitis, or personal or family history of thyroid medullary cancer. New 12-week start date: 4/10/18  12 weeks: 7/10/18  Starting weight: 176 pounds   Goal: At least 5% (8.5 pounds)  Total weight loss: 9 pounds        Key dietary points:    - Meats (preferably organic or grass fed) are great sources of protein and have no carbohydrates. - Recommend coconut, olive, avocado, or almond oils. - When buying dairy, choose regular or full fat options. - Choose vegetables that grow above ground as they are generally lower in carbohydrates and higher in fiber.  - Avoid starches such as bread, rice, potatoes, pasta and all sources of simple sugars (desserts, soda, breakfast cereals). - Choose beverages that are calorie and sugar free. Reminder regarding weight loss medications: You must be seen in office every 2-4 weeks to have your prescriptions refilled. If you are off of your medication for longer than 7 days, you will not be able to restart the medication for at least 6 months. Always call our office to report any side effects.     Females, it is your responsibility to obtain negative

## 2018-07-09 NOTE — PATIENT INSTRUCTIONS
changes in your eating habits. Instead of a diet, focus on lifestyle changes that will improve your health and achieve the right balance of energy and calories. To lose weight, you need to burn more calories than you take in. You can do it by eating healthy foods in reasonable amounts and becoming more active, even a little bit every day. Making small changes over time can add up to a lot. Make a plan for change. Many people have found that naming their reasons for change and staying focused on their plan can make a big difference. Work with your doctor to create a plan that is right for you. · Ask yourself: Janet Vasquez are my personal, most powerful reasons for wanting this change? What will my life look like when I've made the change? \"  · Set your long-term goal. Make it specific, such as \"I will lose x pounds. \"  · Break your long-term goal into smaller, short-term goals. Make these small steps specific and within your reach, things you know you can do. These steps are what keep you going from day to day. How can you stay on your plan for change? Be ready. Choose to start during a time when there are few events that might trigger slip-ups, like holidays, social events, and high-stress periods. Decide on your first few steps. Most people have more success when they make small changes, one step at a time. For example, you might switch a daily candy bar to a piece of fruit, walk 10 minutes more, or add more vegetables to a meal.  Line up your support people. Make sure you're not going to be alone as you make this change. Connect with people who understand how important it is to you. Ask family members and friends for help in keeping with your plan. And think about who could make it harder for you, and how to handle them. Try tracking. People who keep track of what they eat, feel, and do are better at losing weight. Try writing down things like:  · What and how much you eat.   · How you feel before and after each

## 2020-10-05 LAB
ABO, EXTERNAL RESULT: NORMAL
C. TRACHOMATIS, EXTERNAL RESULT: NEGATIVE
HEP B, EXTERNAL RESULT: NEGATIVE
HIV, EXTERNAL RESULT: NON REACTIVE
N. GONORRHOEAE, EXTERNAL RESULT: NEGATIVE
RH FACTOR, EXTERNAL RESULT: POSITIVE
RPR, EXTERNAL RESULT: NON REACTIVE
RUBELLA TITER, EXTERNAL RESULT: NORMAL

## 2021-03-29 ENCOUNTER — OFFICE VISIT (OUTPATIENT)
Dept: PRIMARY CARE CLINIC | Age: 31
End: 2021-03-29
Payer: COMMERCIAL

## 2021-03-29 DIAGNOSIS — Z11.59 SCREENING FOR VIRAL DISEASE: Primary | ICD-10-CM

## 2021-03-29 DIAGNOSIS — Z01.818 PREOP TESTING: ICD-10-CM

## 2021-03-29 LAB
GBS, EXTERNAL RESULT: NEGATIVE
SARS-COV-2: NOT DETECTED

## 2021-03-29 PROCEDURE — G8421 BMI NOT CALCULATED: HCPCS | Performed by: NURSE PRACTITIONER

## 2021-03-29 PROCEDURE — G8428 CUR MEDS NOT DOCUMENT: HCPCS | Performed by: NURSE PRACTITIONER

## 2021-03-29 PROCEDURE — 99211 OFF/OP EST MAY X REQ PHY/QHP: CPT | Performed by: NURSE PRACTITIONER

## 2021-03-29 NOTE — PROGRESS NOTES
Caitlyn Sands received a viral test for COVID-19. They were educated on isolation and quarantine as appropriate. For any symptoms, they were directed to seek care from their PCP, given contact information to establish with a doctor, directed to an urgent care or the emergency room.

## 2021-03-29 NOTE — PATIENT INSTRUCTIONS

## 2021-04-05 ENCOUNTER — OFFICE VISIT (OUTPATIENT)
Dept: PRIMARY CARE CLINIC | Age: 31
End: 2021-04-05
Payer: COMMERCIAL

## 2021-04-05 DIAGNOSIS — Z01.818 PREOP TESTING: Primary | ICD-10-CM

## 2021-04-05 DIAGNOSIS — Z11.59 SCREENING FOR VIRAL DISEASE: ICD-10-CM

## 2021-04-05 PROCEDURE — G8428 CUR MEDS NOT DOCUMENT: HCPCS | Performed by: NURSE PRACTITIONER

## 2021-04-05 PROCEDURE — G8421 BMI NOT CALCULATED: HCPCS | Performed by: NURSE PRACTITIONER

## 2021-04-05 PROCEDURE — 99211 OFF/OP EST MAY X REQ PHY/QHP: CPT | Performed by: NURSE PRACTITIONER

## 2021-04-05 NOTE — PROGRESS NOTES
Hook Kristin received a viral test for COVID-19. They were educated on isolation and quarantine as appropriate. For any symptoms, they were directed to seek care from their PCP, given contact information to establish with a doctor, directed to an urgent care or the emergency room.

## 2021-04-05 NOTE — PATIENT INSTRUCTIONS

## 2021-04-06 LAB — SARS-COV-2, PCR: NOT DETECTED

## 2021-04-16 ENCOUNTER — HOSPITAL ENCOUNTER (INPATIENT)
Age: 31
LOS: 2 days | Discharge: HOME | End: 2021-04-18
Attending: OBSTETRICS & GYNECOLOGY | Admitting: OBSTETRICS & GYNECOLOGY
Payer: COMMERCIAL

## 2021-04-16 ENCOUNTER — ANESTHESIA (OUTPATIENT)
Dept: LABOR AND DELIVERY | Age: 31
End: 2021-04-16
Payer: COMMERCIAL

## 2021-04-16 ENCOUNTER — ANESTHESIA EVENT (OUTPATIENT)
Dept: LABOR AND DELIVERY | Age: 31
End: 2021-04-16
Payer: COMMERCIAL

## 2021-04-16 ENCOUNTER — APPOINTMENT (OUTPATIENT)
Dept: LABOR AND DELIVERY | Age: 31
End: 2021-04-16
Payer: COMMERCIAL

## 2021-04-16 PROBLEM — Z3A.39 39 WEEKS GESTATION OF PREGNANCY: Status: ACTIVE | Noted: 2021-04-16

## 2021-04-16 LAB
ABO/RH: NORMAL
AMPHETAMINE SCREEN, URINE: NORMAL
ANTIBODY SCREEN: NORMAL
BARBITURATE SCREEN URINE: NORMAL
BASOPHILS ABSOLUTE: 0.1 K/UL (ref 0–0.2)
BASOPHILS RELATIVE PERCENT: 0.6 %
BENZODIAZEPINE SCREEN, URINE: NORMAL
BUPRENORPHINE URINE: NORMAL
CANNABINOID SCREEN URINE: NORMAL
COCAINE METABOLITE SCREEN URINE: NORMAL
EOSINOPHILS ABSOLUTE: 0.1 K/UL (ref 0–0.6)
EOSINOPHILS RELATIVE PERCENT: 0.8 %
HCT VFR BLD CALC: 36 % (ref 36–48)
HEMOGLOBIN: 11.8 G/DL (ref 12–16)
LYMPHOCYTES ABSOLUTE: 1.4 K/UL (ref 1–5.1)
LYMPHOCYTES RELATIVE PERCENT: 16.5 %
Lab: NORMAL
MCH RBC QN AUTO: 30 PG (ref 26–34)
MCHC RBC AUTO-ENTMCNC: 32.9 G/DL (ref 31–36)
MCV RBC AUTO: 91.2 FL (ref 80–100)
METHADONE SCREEN, URINE: NORMAL
MONOCYTES ABSOLUTE: 0.5 K/UL (ref 0–1.3)
MONOCYTES RELATIVE PERCENT: 6.1 %
NEUTROPHILS ABSOLUTE: 6.5 K/UL (ref 1.7–7.7)
NEUTROPHILS RELATIVE PERCENT: 76 %
OPIATE SCREEN URINE: NORMAL
OXYCODONE URINE: NORMAL
PDW BLD-RTO: 15.6 % (ref 12.4–15.4)
PH UA: 7
PHENCYCLIDINE SCREEN URINE: NORMAL
PLATELET # BLD: 313 K/UL (ref 135–450)
PMV BLD AUTO: 8.3 FL (ref 5–10.5)
PROPOXYPHENE SCREEN: NORMAL
RBC # BLD: 3.95 M/UL (ref 4–5.2)
TOTAL SYPHILLIS IGG/IGM: NORMAL
WBC # BLD: 8.6 K/UL (ref 4–11)

## 2021-04-16 PROCEDURE — 2500000003 HC RX 250 WO HCPCS

## 2021-04-16 PROCEDURE — 86900 BLOOD TYPING SEROLOGIC ABO: CPT

## 2021-04-16 PROCEDURE — 85025 COMPLETE CBC W/AUTO DIFF WBC: CPT

## 2021-04-16 PROCEDURE — 80307 DRUG TEST PRSMV CHEM ANLYZR: CPT

## 2021-04-16 PROCEDURE — 86780 TREPONEMA PALLIDUM: CPT

## 2021-04-16 PROCEDURE — 2500000003 HC RX 250 WO HCPCS: Performed by: NURSE ANESTHETIST, CERTIFIED REGISTERED

## 2021-04-16 PROCEDURE — 3E033VJ INTRODUCTION OF OTHER HORMONE INTO PERIPHERAL VEIN, PERCUTANEOUS APPROACH: ICD-10-PCS | Performed by: OBSTETRICS & GYNECOLOGY

## 2021-04-16 PROCEDURE — 3700000025 EPIDURAL BLOCK: Performed by: ANESTHESIOLOGY

## 2021-04-16 PROCEDURE — 86850 RBC ANTIBODY SCREEN: CPT

## 2021-04-16 PROCEDURE — 86901 BLOOD TYPING SEROLOGIC RH(D): CPT

## 2021-04-16 PROCEDURE — 0KQM0ZZ REPAIR PERINEUM MUSCLE, OPEN APPROACH: ICD-10-PCS | Performed by: OBSTETRICS & GYNECOLOGY

## 2021-04-16 PROCEDURE — 1220000000 HC SEMI PRIVATE OB R&B

## 2021-04-16 PROCEDURE — 6360000002 HC RX W HCPCS: Performed by: OBSTETRICS & GYNECOLOGY

## 2021-04-16 PROCEDURE — 10907ZC DRAINAGE OF AMNIOTIC FLUID, THERAPEUTIC FROM PRODUCTS OF CONCEPTION, VIA NATURAL OR ARTIFICIAL OPENING: ICD-10-PCS | Performed by: OBSTETRICS & GYNECOLOGY

## 2021-04-16 PROCEDURE — 51701 INSERT BLADDER CATHETER: CPT

## 2021-04-16 PROCEDURE — 2580000003 HC RX 258: Performed by: OBSTETRICS & GYNECOLOGY

## 2021-04-16 PROCEDURE — 7200000001 HC VAGINAL DELIVERY

## 2021-04-16 PROCEDURE — 6370000000 HC RX 637 (ALT 250 FOR IP): Performed by: OBSTETRICS & GYNECOLOGY

## 2021-04-16 RX ORDER — SODIUM CHLORIDE, SODIUM LACTATE, POTASSIUM CHLORIDE, CALCIUM CHLORIDE 600; 310; 30; 20 MG/100ML; MG/100ML; MG/100ML; MG/100ML
INJECTION, SOLUTION INTRAVENOUS CONTINUOUS
Status: DISCONTINUED | OUTPATIENT
Start: 2021-04-16 | End: 2021-04-18 | Stop reason: HOSPADM

## 2021-04-16 RX ORDER — SODIUM CHLORIDE 9 MG/ML
25 INJECTION, SOLUTION INTRAVENOUS PRN
Status: DISCONTINUED | OUTPATIENT
Start: 2021-04-16 | End: 2021-04-16

## 2021-04-16 RX ORDER — FERROUS SULFATE 325(65) MG
325 TABLET ORAL 2 TIMES DAILY WITH MEALS
Status: DISCONTINUED | OUTPATIENT
Start: 2021-04-16 | End: 2021-04-18 | Stop reason: HOSPADM

## 2021-04-16 RX ORDER — IBUPROFEN 800 MG/1
800 TABLET ORAL EVERY 6 HOURS PRN
Status: DISCONTINUED | OUTPATIENT
Start: 2021-04-16 | End: 2021-04-18 | Stop reason: HOSPADM

## 2021-04-16 RX ORDER — SODIUM CHLORIDE 0.9 % (FLUSH) 0.9 %
5-40 SYRINGE (ML) INJECTION EVERY 12 HOURS SCHEDULED
Status: DISCONTINUED | OUTPATIENT
Start: 2021-04-16 | End: 2021-04-18 | Stop reason: HOSPADM

## 2021-04-16 RX ORDER — ONDANSETRON 2 MG/ML
4 INJECTION INTRAMUSCULAR; INTRAVENOUS EVERY 6 HOURS PRN
Status: DISCONTINUED | OUTPATIENT
Start: 2021-04-16 | End: 2021-04-16

## 2021-04-16 RX ORDER — SODIUM CHLORIDE 0.9 % (FLUSH) 0.9 %
10 SYRINGE (ML) INJECTION PRN
Status: DISCONTINUED | OUTPATIENT
Start: 2021-04-16 | End: 2021-04-16

## 2021-04-16 RX ORDER — ACETAMINOPHEN 325 MG/1
650 TABLET ORAL EVERY 4 HOURS PRN
Status: DISCONTINUED | OUTPATIENT
Start: 2021-04-16 | End: 2021-04-18 | Stop reason: HOSPADM

## 2021-04-16 RX ORDER — OXYCODONE HYDROCHLORIDE 5 MG/1
5 TABLET ORAL ONCE
Status: COMPLETED | OUTPATIENT
Start: 2021-04-16 | End: 2021-04-16

## 2021-04-16 RX ORDER — SIMETHICONE 80 MG
80 TABLET,CHEWABLE ORAL EVERY 6 HOURS PRN
Status: DISCONTINUED | OUTPATIENT
Start: 2021-04-16 | End: 2021-04-18 | Stop reason: HOSPADM

## 2021-04-16 RX ORDER — ONDANSETRON 2 MG/ML
4 INJECTION INTRAMUSCULAR; INTRAVENOUS EVERY 6 HOURS PRN
Status: DISCONTINUED | OUTPATIENT
Start: 2021-04-16 | End: 2021-04-18 | Stop reason: HOSPADM

## 2021-04-16 RX ORDER — SODIUM CHLORIDE 9 MG/ML
25 INJECTION, SOLUTION INTRAVENOUS PRN
Status: DISCONTINUED | OUTPATIENT
Start: 2021-04-16 | End: 2021-04-18 | Stop reason: HOSPADM

## 2021-04-16 RX ORDER — SODIUM CHLORIDE 0.9 % (FLUSH) 0.9 %
10 SYRINGE (ML) INJECTION EVERY 12 HOURS SCHEDULED
Status: DISCONTINUED | OUTPATIENT
Start: 2021-04-16 | End: 2021-04-16

## 2021-04-16 RX ORDER — HYDROCODONE BITARTRATE AND ACETAMINOPHEN 5; 325 MG/1; MG/1
1 TABLET ORAL EVERY 4 HOURS PRN
Status: DISCONTINUED | OUTPATIENT
Start: 2021-04-16 | End: 2021-04-18 | Stop reason: HOSPADM

## 2021-04-16 RX ORDER — SODIUM CHLORIDE 0.9 % (FLUSH) 0.9 %
5-40 SYRINGE (ML) INJECTION PRN
Status: DISCONTINUED | OUTPATIENT
Start: 2021-04-16 | End: 2021-04-18 | Stop reason: HOSPADM

## 2021-04-16 RX ORDER — BUPIVACAINE HYDROCHLORIDE 2.5 MG/ML
INJECTION, SOLUTION EPIDURAL; INFILTRATION; INTRACAUDAL PRN
Status: DISCONTINUED | OUTPATIENT
Start: 2021-04-16 | End: 2021-04-16 | Stop reason: SDUPTHER

## 2021-04-16 RX ORDER — SODIUM CHLORIDE, SODIUM LACTATE, POTASSIUM CHLORIDE, CALCIUM CHLORIDE 600; 310; 30; 20 MG/100ML; MG/100ML; MG/100ML; MG/100ML
INJECTION, SOLUTION INTRAVENOUS CONTINUOUS
Status: DISCONTINUED | OUTPATIENT
Start: 2021-04-16 | End: 2021-04-16

## 2021-04-16 RX ORDER — LANOLIN 100 %
OINTMENT (GRAM) TOPICAL PRN
Status: DISCONTINUED | OUTPATIENT
Start: 2021-04-16 | End: 2021-04-18 | Stop reason: HOSPADM

## 2021-04-16 RX ADMIN — Medication 1 MILLI-UNITS/MIN: at 08:55

## 2021-04-16 RX ADMIN — BENZOCAINE AND LEVOMENTHOL: 200; 5 SPRAY TOPICAL at 17:25

## 2021-04-16 RX ADMIN — HYDROCODONE BITARTRATE AND ACETAMINOPHEN 1 TABLET: 5; 325 TABLET ORAL at 21:12

## 2021-04-16 RX ADMIN — SODIUM CHLORIDE, POTASSIUM CHLORIDE, SODIUM LACTATE AND CALCIUM CHLORIDE: 600; 310; 30; 20 INJECTION, SOLUTION INTRAVENOUS at 08:55

## 2021-04-16 RX ADMIN — Medication 15 ML/HR: at 11:17

## 2021-04-16 RX ADMIN — IBUPROFEN 800 MG: 800 TABLET, FILM COATED ORAL at 17:25

## 2021-04-16 RX ADMIN — Medication 10 ML: at 20:23

## 2021-04-16 RX ADMIN — HYDROCODONE BITARTRATE AND ACETAMINOPHEN 1 TABLET: 5; 325 TABLET ORAL at 17:09

## 2021-04-16 RX ADMIN — BUPIVACAINE HYDROCHLORIDE 6 ML: 2.5 INJECTION, SOLUTION EPIDURAL; INFILTRATION; INTRACAUDAL; PERINEURAL at 11:12

## 2021-04-16 RX ADMIN — IBUPROFEN 800 MG: 800 TABLET, FILM COATED ORAL at 23:45

## 2021-04-16 RX ADMIN — WITCH HAZEL 40 EACH: 500 SOLUTION RECTAL; TOPICAL at 17:25

## 2021-04-16 RX ADMIN — FAMOTIDINE 20 MG: 10 INJECTION, SOLUTION INTRAVENOUS at 13:52

## 2021-04-16 RX ADMIN — Medication 166.7 ML: at 14:10

## 2021-04-16 RX ADMIN — OXYCODONE 5 MG: 5 TABLET ORAL at 20:22

## 2021-04-16 ASSESSMENT — PAIN SCALES - GENERAL: PAINLEVEL_OUTOF10: 8

## 2021-04-16 NOTE — PROGRESS NOTES
Comfortable with epidural   Vitals:    04/16/21 1127   BP: 123/67   Pulse: 74   Resp:      Cat 1 FHTs   Pit at 2  SVE 3/70/-2, AROM clear fluid     Continue pitocin titration    Birgit Haji MD

## 2021-04-16 NOTE — LACTATION NOTE
This note was copied from a baby's chart. Lactation Progress Note      Data:   Initial consult during lactation rounds with multip experienced breast feeder, who delivered this afternoon. MOB reports baby latched easily after delivery and breast fed well x 30 minutes. MOB breast fed her first for 5-6 months (he is now 3years old). MOB states that he had a tongue tie, that was revised, and struggled with continuing to breast feed with returning to work. Infant appears to be able to extend tongue beyond the lower gumline on assessment. Action: Introduced self as [facility] on for this evening and offered much support. Discussed that infant appears to have good mobility of the tongue. Reassured that baby is able to extend tongue past the lower gumline, explaining that tongue ties do run in families and more common with boys. Educated on red flags to monitor for. Reviewed importance of KIT, and explained how a good latch should look and feel. Encouraged to call for [facility] to assess latch as needed. Instructed how to break latch if ever shallow, pinching or painful and instructed that nipple should be rounded with release from the breast. Introductory breast feeding education provided including breast care, when to expect mature milk supply, expected  feeding behaviors during the first 24-48 hours of life, and how to know infant is getting enough at the breast including appropriate feedings, output, and weight trends. Encouraged much STS, offering the breast exclusively, when infant is first begining to wake and show hunger cues, and every 2-3 hours if baby is sleepy and without cues. Gave tips to wake sleepy baby as needed. Encouraged much STS and hand expression of colostrum when offering the breast. Reviewed risks related to pacifiers, artificial nipples, and formula supplements when given without medical indication. Encouraged exclusive breast feeding unless medical indication were to arise.  Name and number provided on whiteboard. Encouraged to call for [facility] to assess latch and for f/u support prn. Response: Verbalized understanding of teaching provided. Comfortable with breast feeding at this time. Will call for f/u support prn.

## 2021-04-16 NOTE — ANESTHESIA PRE PROCEDURE
Department of Anesthesiology  Preprocedure Note       Name:  Kathleen Cristina   Age:  32 y.o.  :  1990                                          MRN:  [id number]         Date:  2021      Surgeon: * No surgeons listed *    Procedure: * No procedures listed *    Medications prior to admission:   Prior to Admission medications    Medication Sig Start Date End Date Taking? Authorizing Provider   lidocaine (LIDODERM) 5 % Place 1 patch onto the skin daily 12 hours on, 12 hours off. 18   RIN Pal - CNP   Prenatal Vit-Fe Fumarate-FA (PNV PRENATAL PLUS MULTIVITAMIN) 27-1 MG TABS  17   Historical Provider, MD       Current medications:    Current Facility-Administered Medications   Medication Dose Route Frequency Provider Last Rate Last Admin    lactated ringers infusion   Intravenous Continuous Macy Arndt  mL/hr at 21 0855 New Bag at 21 0855    sodium chloride flush 0.9 % injection 10 mL  10 mL Intravenous 2 times per day Macy Arndt MD        sodium chloride flush 0.9 % injection 10 mL  10 mL Intravenous PRN Macy Arndt MD        0.9 % sodium chloride infusion  25 mL Intravenous PRN Macy Arndt MD        oxytocin (PITOCIN) 10 unit bolus from the bag  10 Units Intravenous PRSAL Arndt MD        And    oxytocin (PITOCIN) 30 units in 500 mL infusion  87.3 bobby-units/min Intravenous Continuous MOHAMUD Arndt MD        ondanset[facility]) injection 4 mg  4 mg Intravenous Q6H PRN Macy Arndt MD        oxytocin (PITOCIN) 30 units in 500 mL infusion  1-20 bobby-units/min Intravenous Continuous Macy Arndt MD 2 mL/hr at 21 1030 2 bobby-units/min at 21 1030       Allergies:     Allergies   Allergen Reactions    Penicillins      Unsure of reaction to pcn    Eggs Or Egg-Derived Products Rash     2017 -  2017 -  2016 -  2016 -      Lac Bovis Rash     2017 -  2017 - 11/09/2016 -  09/12/2016 -      Peanut-Containing Drug Products Rash     03/20/2017 -  02/09/2017 -  11/09/2016 -  09/12/2016 -         Problem List:    Patient Active Problem List   Diagnosis Code    Vaginal discharge N89.8    Pelvic pain in female R10.2    PCOS (polycystic ovarian syndrome) E28.2    Obesity (BMI 30-39. 9) E66.9    Threatened labor at term O50.10    Forceps delivery O75.9    Vitamin D deficiency E55.9    Mixed hyperlipidemia E78.2    Obesity (BMI 30.0-34. 9) E66.9    39 weeks gestation of pregnancy Z3A.39       Past Medical History:        Diagnosis Date    Anxiety     Depression     Hyperlipidemia     Infertility, female     iui    PCOS (polycystic ovarian syndrome)        Past Surgical History:        Procedure Laterality Date    ADENOIDECTOMY      had them removed twice as they grew back at age 16    BACK SURGERY      x 2    Donny Forno 76      as a child       Social History:    Social History     Tobacco Use    Smoking status: Former Smoker    Smokeless tobacco: Never Used   Substance Use Topics    Alcohol use: No                                Counseling given: Not Answered      Vital Signs (Current):   Vitals:    04/16/21 0820 04/16/21 1000   BP: 121/72 (!) 134/90   Pulse: 83 71   Resp: 16 16   Weight: 208 lb (94.3 kg)    Height: 5' 1\" (1.549 m)                                               BP Readings from Last 3 Encounters:   04/16/21 (!) 134/90   07/09/18 118/80   06/11/18 124/80       NPO Status: Time of last liquid consumption: 0700                        Time of last solid consumption: 0700                        Date of last liquid consumption: 04/16/21                        Date of last solid food consumption: 04/16/21    BMI:   Wt Readings from Last 3 Encounters:   04/16/21 208 lb (94.3 kg)   07/09/18 167 lb (75.8 kg)   06/11/18 169 lb 8 oz (76.9 kg)     Body mass index is 39.3 kg/m².     CBC:   Lab Results   Component Value Date    WBC 8.6 04/16/2021    RBC 3.95 04/16/2021    HGB 11.8 04/16/2021    HCT 36.0 04/16/2021    MCV 91.2 04/16/2021    RDW 15.6 04/16/2021     04/16/2021       CMP:   Lab Results   Component Value Date     05/25/2018    K 3.6 05/25/2018     05/25/2018    CO2 22 05/25/2018    BUN 8 05/25/2018    CREATININE 0.6 05/25/2018    GFRAA >60 05/25/2018    GFRAA >60 06/29/2012    AGRATIO 1.3 05/25/2018    LABGLOM >60 05/25/2018    GLUCOSE 92 05/25/2018    PROT 7.2 05/25/2018    PROT 8.1 10/14/2011    CALCIUM 9.0 05/25/2018    BILITOT 0.4 05/25/2018    ALKPHOS 60 05/25/2018    AST 40 05/25/2018    ALT 59 05/25/2018       POC Tests: No results for input(s): POCGLU, POCNA, POCK, POCCL, POCBUN, POCHEMO, POCHCT in the last 72 hours.     Coags:   Lab Results   Component Value Date    PROTIME 11.1 10/27/2011    INR 1.02 10/27/2011    APTT 29.2 10/27/2011       HCG (If Applicable):   Lab Results   Component Value Date    PREGTESTUR NEG 06/29/2012        ABGs: No results found for: PHART, PO2ART, WUA3UQT, RNW3YBF, BEART, [id number]     Type & Screen (If Applicable):  No results found for: LABABO, LABRH    Drug/Infectious Status (If Applicable):  No results found for: HIV, HEPCAB    COVID-19 Screening (If Applicable):   Lab Results   Component Value Date    COVID19 Not Detected 04/05/2021           Anesthesia Evaluation  Patient summary reviewed and Nursing notes reviewed no history of anesthetic complications:   Airway: Mallampati: II     Neck ROM: full   Dental: normal exam         Pulmonary:Negative Pulmonary ROS and normal exam                               Cardiovascular:Negative CV ROS                      Neuro/Psych:   Negative Neuro/Psych ROS  (+) depression/anxiety             GI/Hepatic/Renal: Neg GI/Hepatic/Renal ROS            Endo/Other: Negative Endo/Other ROS                     ROS comment: PCOS Abdominal:           Vascular:                                        Anesthesia Plan      epidural     ASA 2 - emergent

## 2021-04-16 NOTE — L&D DELIVERY SUMMARY NOTE
[address] [address]                            LABOR AND DELIVERY NOTE    PATIENT NAME: Iesha Nicholson                      :        1990  MED REC NO:   [id number]                          ROOM:       3647  ACCOUNT NO:   [de-identified]                           ADMIT DATE: 2021  PROVIDER:     Glorious Phoenix, MD    DATE OF PROCEDURE:  2021    DELIVERY SUMMARY    The patient is a 80-year-old G2, P1-0-0-1 at 39 weeks and 1 day,  presented for induction of labor and noted to be GBS negative. She did  have cell-free DNA exam, which was normal consisted of male fetus. She  was noted to be 2 to 3 cm, 60% effaced, and -2 station on admission and  was started on Pitocin. She did begin to get uncomfortable and  requested and received an epidural.  Her membranes were artificially  ruptured for clear fluid. She then made quick change to complete in +2  station and began to push. After pushing for less than 10 minutes,  fetal head was at the perineum. She had a spontaneous vaginal delivery  of a baby boy in OA position. The rest of the infant was delivered  atraumatically and placed on the mother's abdomen. The cord was cut and  clamped after one minute of delayed cord clamping. Delivery of the  placenta was spontaneous. The perineum and the vagina were explored. A  second-degree laceration was repaired in a standard fashion. Mom and  baby were both doing well at the end of this dictation. She had a baby  boy. Estimated blood loss was 200 mL.         Lety Brady MD    D: 2021 15:23:45       T: 2021 17:09:30     [email or web address]  Job#: [id number]     Doc#: [id number]    CC:

## 2021-04-16 NOTE — H&P
Department of Obstetrics and Gynecology   Obstetrics History and Physical        CHIEF COMPLAINT:  Induction- scheduled    HISTORY OF PRESENT ILLNESS:      The patient is a 32 y.o. female at 36w3d.   OB History        2    Para   1    Term   1            AB        Living   1       SAB        TAB        Ectopic        Molar        Multiple        Live Births   1            Patient presents with a chief complaint as above and is being admitted for induction    cfDNA XY  GBS neg   Forceps G1     Estimated Due Date: Estimated Date of Delivery: 21        PAST OB HISTORY:  OB History        2    Para   1    Term   1            AB        Living   1       SAB        TAB        Ectopic        Molar        Multiple        Live Births   1                Past Medical History:        Diagnosis Date    Anxiety     Depression     Hyperlipidemia     Infertility, female     iui    PCOS (polycystic ovarian syndrome)      Past Surgical History:        Procedure Laterality Date    ADENOIDECTOMY      had them removed twice as they grew back at age 16    BACK SURGERY      x 2    Donny Forno 76      as a child     Allergies:  Penicillins, Eggs or egg-derived products, Lac bovis, and Peanut-containing drug products    Social History:    Social History     Socioeconomic History    Marital status:      Spouse name: Not on file    Number of children: Not on file    Years of education: Not on file    Highest education level: Not on file   Occupational History    Not on file   Social Needs    Financial resource strain: Not on file    Food insecurity     Worry: Not on file     Inability: Not on file    Transportation needs     Medical: Not on file     Non-medical: Not on file   Tobacco Use    Smoking status: Former Smoker    Smokeless tobacco: Never Used   Substance and Sexual Activity    Alcohol use: No    Drug use: Never    Sexual activity: Not on file   Lifestyle  Physical activity     Days per week: Not on file     Minutes per session: Not on file    Stress: Not on file   Relationships    Social connections     Talks on phone: Not on file     Gets together: Not on file     Attends Church service: Not on file     Active member of club or organization: Not on file     Attends meetings of clubs or organizations: Not on file     Relationship status: Not on file    Intimate partner violence     Fear of current or ex partner: Not on file     Emotionally abused: Not on file     Physically abused: Not on file     Forced sexual activity: Not on file   Other Topics Concern    Not on file   Social History Narrative    Not on file     Family History:       Problem Relation Age of Onset    High Blood Pressure Mother     Migraines Mother     Depression Mother     COPD Mother     Depression Sister     Glaucoma Maternal Aunt     High Blood Pressure Maternal Grandmother     High Cholesterol Maternal Grandmother     Stroke Maternal Grandmother     Kidney Disease Maternal Grandmother     Arthritis Maternal Grandmother     High Blood Pressure Maternal Grandfather     High Cholesterol Maternal Grandfather     Stroke Maternal Grandfather     Diabetes Maternal Grandfather     Arthritis Maternal Grandfather     Glaucoma Maternal Grandfather      Medications Prior to Admission:  Medications Prior to Admission: lidocaine (LIDODERM) 5 %, Place 1 patch onto the skin daily 12 hours on, 12 hours off. [DISCONTINUED] norethindrone-ethinyl estradiol (ORTHO-NOVUM 1-35 TAB;NORTREL 1-35 TAB) 1-35 MG-MCG per tablet, Take 3 tablets po today, 2 tablets po tomorrow and then take 1 tablet po daily.   Prenatal Vit-Fe Fumarate-FA (PNV PRENATAL PLUS MULTIVITAMIN) 27-1 MG TABS,         PHYSICAL EXAM:  Vitals:    04/16/21 0820   BP: 121/72   Pulse: 83   Resp: 16   Weight: 208 lb (94.3 kg)   Height: 5' 1\" (1.549 m)     General appearance:  awake, alert, cooperative, no apparent distress, and appears stated age  Neurologic:  Awake, alert, oriented to name, place and time. Lungs:  No increased work of breathing, good air exchange  Abdomen:  Soft, non tender, gravid, consistent with her gestational age, EFW by Leopald's manouever was 7.5#  Fetal heart rate:  Reassuring.   Pelvis:  Adequate pelvis  Cervix: 2-3/60/-2  Contraction frequency:  Irregular     Membranes:  Intact    Labs: pending    ASSESSMENT AND PLAN:    Labor: Admit, anticipate normal delivery, routine labor orders  Fetus: Reassuring  GBS: No  Other: plan pitocin,epidural PRN     Shannen Naranjo MD

## 2021-04-16 NOTE — L&D DELIVERY NOTE
Department of Obstetrics and Gynecology  Spontaneous Vaginal Delivery Note    Labor & Delivery Summary  Dilation Complete Date: 21  Dilation Complete Time: 1400    Pre-operative Diagnosis:  27yo  at 39wk1d IOL     Post-operative Diagnosis:  Living  infant(s) and Male    Procedure:  Spontaneous vaginal delivery    Surgeon:   Dr. Delatorre Levels for the patient's :  [facility] [[id number]]   APGAR One: 8      Information for the patient's :  [facility] [[id number]]   APGAR Five: 9       Information for the patient's :  [facility] [[id number]]   Birth Weight: N/A       Anesthesia:  epidural anesthesia    Estimated blood loss:  200cc    Specimen:  Placenta not sent to pathology     Cord blood sent No    Complications:  none    Condition:  infant stable to general nursery and mother stable    Details of Procedure: The patient is a 32 y.o. female at 36w3d   OB History        2    Para   1    Term   1            AB        Living   1       SAB        TAB        Ectopic        Molar        Multiple        Live Births   1             who was admitted for induction. She received the following interventions: ARBOW and IV Pitocin induction She was known to be GBS negative and did not receive antibiotic prophylaxis. The patient progressed well,did receive an epidural, became complete and started to push. After pushing for less then 10 minutes, the fetal head was at the perineum and she had a  of a baby boy in OA position, then the rest of the infant delivered atraumatically, placed on mother abdomen. Cord was clamped and cut after 1 minute delayed cord clamping. The delivery of the placenta was spontaneous. The perineum and vagina were explored and a second degree laceration was repaired in standard fashion.     Dictation #: [id number]    Sebastian Mann MD

## 2021-04-16 NOTE — PROGRESS NOTES
Feeling pressures   Vitals:    04/16/21 1334   BP: 119/76   Pulse: 76   Resp: 16   Temp:      Reassuring fetal heart tones   AC/0     Andrew Rod MD

## 2021-04-17 LAB
HCT VFR BLD CALC: 32.6 % (ref 36–48)
HEMOGLOBIN: 10.7 G/DL (ref 12–16)
MCH RBC QN AUTO: 30.4 PG (ref 26–34)
MCHC RBC AUTO-ENTMCNC: 32.8 G/DL (ref 31–36)
MCV RBC AUTO: 92.5 FL (ref 80–100)
PDW BLD-RTO: 16 % (ref 12.4–15.4)
PLATELET # BLD: 277 K/UL (ref 135–450)
PMV BLD AUTO: 8.1 FL (ref 5–10.5)
RBC # BLD: 3.52 M/UL (ref 4–5.2)
WBC # BLD: 10.1 K/UL (ref 4–11)

## 2021-04-17 PROCEDURE — 85027 COMPLETE CBC AUTOMATED: CPT

## 2021-04-17 PROCEDURE — 6370000000 HC RX 637 (ALT 250 FOR IP): Performed by: OBSTETRICS & GYNECOLOGY

## 2021-04-17 PROCEDURE — 36415 COLL VENOUS BLD VENIPUNCTURE: CPT

## 2021-04-17 PROCEDURE — 1220000000 HC SEMI PRIVATE OB R&B

## 2021-04-17 PROCEDURE — 6360000002 HC RX W HCPCS: Performed by: OBSTETRICS & GYNECOLOGY

## 2021-04-17 PROCEDURE — 2580000003 HC RX 258: Performed by: OBSTETRICS & GYNECOLOGY

## 2021-04-17 RX ORDER — DOCUSATE SODIUM 100 MG/1
100 CAPSULE, LIQUID FILLED ORAL 2 TIMES DAILY PRN
Status: DISCONTINUED | OUTPATIENT
Start: 2021-04-17 | End: 2021-04-18 | Stop reason: HOSPADM

## 2021-04-17 RX ADMIN — IBUPROFEN 800 MG: 800 TABLET, FILM COATED ORAL at 16:43

## 2021-04-17 RX ADMIN — ONDANSETRON 4 MG: 2 INJECTION INTRAMUSCULAR; INTRAVENOUS at 05:26

## 2021-04-17 RX ADMIN — HYDROCODONE BITARTRATE AND ACETAMINOPHEN 1 TABLET: 5; 325 TABLET ORAL at 22:12

## 2021-04-17 RX ADMIN — HYDROCODONE BITARTRATE AND ACETAMINOPHEN 1 TABLET: 5; 325 TABLET ORAL at 11:28

## 2021-04-17 RX ADMIN — HYDROCODONE BITARTRATE AND ACETAMINOPHEN 1 TABLET: 5; 325 TABLET ORAL at 05:55

## 2021-04-17 RX ADMIN — ACETAMINOPHEN 650 MG: 325 TABLET ORAL at 17:59

## 2021-04-17 RX ADMIN — IBUPROFEN 800 MG: 800 TABLET, FILM COATED ORAL at 05:55

## 2021-04-17 RX ADMIN — DOCUSATE SODIUM 100 MG: 100 CAPSULE, LIQUID FILLED ORAL at 16:43

## 2021-04-17 RX ADMIN — IBUPROFEN 800 MG: 800 TABLET, FILM COATED ORAL at 23:45

## 2021-04-17 RX ADMIN — HYDROCODONE BITARTRATE AND ACETAMINOPHEN 1 TABLET: 5; 325 TABLET ORAL at 01:50

## 2021-04-17 RX ADMIN — Medication 10 ML: at 21:11

## 2021-04-17 ASSESSMENT — PAIN SCALES - GENERAL
PAINLEVEL_OUTOF10: 4
PAINLEVEL_OUTOF10: 5
PAINLEVEL_OUTOF10: 5
PAINLEVEL_OUTOF10: 4

## 2021-04-17 NOTE — PLAN OF CARE
Problem: Discharge Planning:  Goal: Discharged to appropriate level of care  Outcome: Ongoing     Problem: Constipation:  Goal: Bowel elimination is within specified parameters  Outcome: Ongoing     Problem: Fluid Volume - Imbalance:  Goal: Absence of imbalanced fluid volume signs and symptoms  Outcome: Ongoing  Goal: Absence of postpartum hemorrhage signs and symptoms  Outcome: Ongoing     Problem: Infection - Risk of, Puerperal Infection:  Goal: Will show no infection signs and symptoms  Outcome: Ongoing     Problem: Mood - Altered:  Goal: Mood stable  Outcome: Ongoing     Problem: Pain - Acute:  Goal: Pain level will decrease  Outcome: Ongoing

## 2021-04-17 NOTE — LACTATION NOTE
This note was copied from a baby's chart. Lactation Progress Note      Data:   F/U with multip 1PP with breastfeeding and lactation rounds. MOB states that infant seems to be doing well with latching to both breast. States that yesterday infant had a fussy spell that lasted for several hours until infant had a large bowel movement. States that infant was more calm overnight and latching well. Infant just back to room after circ, and lying STS with mob. Infant output established, weight loss @ 3.57%. Action: Introduced self to mob as  [facility] for the day. Name and number placed on whiteboard. breast feeding education provided including breast care, when to expect mature milk supply, expected  feeding behaviors during the next 24-48 hours of life, and how to know infant is getting enough at the breast including appropriate feedings, output, and weight trends. Encouraged much STS, offering the breast exclusively, when infant is first begining to wake and show hunger cues, and every 2-3 hours if baby is sleepy and without cues. Gave tips to wake sleepy baby as needed. Encouraged much STS and hand expression of colostrum when offering the breast. Reviewed risks related to pacifiers, artificial nipples, and formula supplements when given without medical indication. Encouraged exclusive breast feeding unless medical indication were to arise. Name and number provided on whiteboard. Encouraged to call for  [facility] to assess latch and for f/u support prn. Response: Verbalized understanding of teaching provided. Comfortable with breast feeding at this time. Will call for f/u support prn.

## 2021-04-17 NOTE — ANESTHESIA POSTPROCEDURE EVALUATION
Department of Anesthesiology  Postprocedure Note    Patient: Chapito Alvarado  MRN: [id number]  YOB: 1990  Date of evaluation: 4/17/2021  Time:  8:55 AM     Procedure Summary     Date: 04/16/21 Room / Location:     Anesthesia Start: 1100 Anesthesia Stop: 5189    Procedure: Labor Analgesia Diagnosis:     Scheduled Providers:  Responsible Provider: Davin Arciniega MD    Anesthesia Type: epidural ASA Status: 2 - Emergent          Anesthesia Type: epidural    Greg Phase I: Greg Score: 9    Greg Phase II: Greg Score: 9    Last vitals: Reviewed and per EMR flowsheets. Anesthesia Post Evaluation    Patient location during evaluation: bedside  Patient participation: complete - patient participated  Level of consciousness: awake and alert  Pain score: 5  Airway patency: patent  Nausea & Vomiting: nausea (nausea that patient states is related to po Hydrocodone, recieved zofran with last dose)  Complications: no  Cardiovascular status: hemodynamically stable  Respiratory status: room air and spontaneous ventilation  Hydration status: stable  Comments: Mild itching patient thinks is related to prn hydrocodone.

## 2021-04-17 NOTE — PROGRESS NOTES
Patient complains of occasional nausea and rectal pressure overnight. Tearful while discussing discomfort and feeling exhausted. Recently medicated with antiemetic and pain medication. Waffle cushion in use and heat/ice offered. Patient declines any further needs. Spouse is supportive and actively caring for infant and tentative to patient needs. RN to continue to monitor.

## 2021-04-18 VITALS
TEMPERATURE: 98.2 F | HEART RATE: 59 BPM | RESPIRATION RATE: 16 BRPM | WEIGHT: 208 LBS | HEIGHT: 61 IN | BODY MASS INDEX: 39.27 KG/M2 | DIASTOLIC BLOOD PRESSURE: 71 MMHG | SYSTOLIC BLOOD PRESSURE: 118 MMHG

## 2021-04-18 PROCEDURE — 6370000000 HC RX 637 (ALT 250 FOR IP): Performed by: OBSTETRICS & GYNECOLOGY

## 2021-04-18 RX ORDER — IBUPROFEN 800 MG/1
800 TABLET ORAL EVERY 6 HOURS PRN
Qty: 120 TABLET | Refills: 3 | Status: SHIPPED | OUTPATIENT
Start: 2021-04-18

## 2021-04-18 RX ORDER — HYDROCODONE BITARTRATE AND ACETAMINOPHEN 5; 325 MG/1; MG/1
1 TABLET ORAL EVERY 6 HOURS PRN
Qty: 15 TABLET | Refills: 0 | Status: SHIPPED | OUTPATIENT
Start: 2021-04-18 | End: 2021-04-21

## 2021-04-18 RX ADMIN — IBUPROFEN 800 MG: 800 TABLET, FILM COATED ORAL at 07:17

## 2021-04-18 RX ADMIN — HYDROCODONE BITARTRATE AND ACETAMINOPHEN 1 TABLET: 5; 325 TABLET ORAL at 08:41

## 2021-04-18 RX ADMIN — BENZOCAINE AND LEVOMENTHOL: 200; 5 SPRAY TOPICAL at 08:41

## 2021-04-18 RX ADMIN — WITCH HAZEL 40 EACH: 500 SOLUTION RECTAL; TOPICAL at 08:41

## 2021-04-18 RX ADMIN — DOCUSATE SODIUM 100 MG: 100 CAPSULE, LIQUID FILLED ORAL at 08:41

## 2021-04-18 RX ADMIN — HYDROCODONE BITARTRATE AND ACETAMINOPHEN 1 TABLET: 5; 325 TABLET ORAL at 03:40

## 2021-04-18 NOTE — PROGRESS NOTES
Discharge Phone Call Log  Patient Name: Kya Hicks     [facility] Care Provider: Bibi Diana MD Discharge Date: 2021    Disposition of baby:    Phone Number: [phone] (home)     Attempts to Contact:  Date:    Nurse  Date:    Nurse  Date:    Nurse    Introduce yourself and explain the questionnaire. 1.  Now that you are at home is your pain being well controlled? Y/N   What pain reducing measures are you using? ____________________________________        Information for the patient's :  Lolita Suárez [[id number]]   Delivery Method: Vaginal, Spontaneous        2. Are you having any infant feeding issues? Y/N _____________________________      If breastfeeding, were you satisfied with the breastfeeding support services offered? Y/N    3. Any engorgement problems? Y/N  Have you had to supplement? Y/N    4. Have you made or have you already had your first appointment with the baby's doctor? Y/N                If no, do you know when to schedule it? 5. Have you scheduled your follow-up appointment? Y/N  If no, do you know when to schedule it? Y/N    6. Did your nurses and physicians include you in the plan of care, communicating with      you respectfully, and in a manner easy to understand? Y/N       Comments:  ___________________________________________________________    7. Is there anyone in particular you would like to mention who provided care for you?  ______________________________        8. Do you have any questions about your discharge instructions? Y/N  Did you receive a Discharge [address]? Y/N       9. Did your discharge occur in a timely manner? Y/N        Comments: __________________________________________________________    Remember to describe the hospital survey and ask patient to return it when possible.     Questions During Interview:__________________________________________________________    Teaching During interview :___________________________________________________________      ___________________________RN       Date:______________Time:________________

## 2021-04-18 NOTE — PLAN OF CARE
Problem: Discharge Planning:  Goal: Discharged to appropriate level of care  Description: Discharged to appropriate level of care  4/17/2021 2045 by Kenneth Amador RN  Outcome: Ongoing  4/17/2021 0802 by Lee Encinas RN  Outcome: Ongoing     Problem: Constipation:  Goal: Bowel elimination is within specified parameters  Description: Bowel elimination is within specified parameters  4/17/2021 2045 by Kenneth Amador RN  Outcome: Ongoing  4/17/2021 0802 by Lee Encinas RN  Outcome: Ongoing     Problem: Fluid Volume - Imbalance:  Goal: Absence of imbalanced fluid volume signs and symptoms  Description: Absence of imbalanced fluid volume signs and symptoms  4/17/2021 2045 by Kenneth Amador RN  Outcome: Ongoing  4/17/2021 0802 by Lee Encinas RN  Outcome: Ongoing  Goal: Absence of postpartum hemorrhage signs and symptoms  Description: Absence of postpartum hemorrhage signs and symptoms  4/17/2021 2045 by Kenneth Amador RN  Outcome: Ongoing  4/17/2021 0802 by Lee Encinas RN  Outcome: Ongoing     Problem: Infection - Risk of, Puerperal Infection:  Goal: Will show no infection signs and symptoms  Description: Will show no infection signs and symptoms  4/17/2021 2045 by Kenneth Amador RN  Outcome: Ongoing  4/17/2021 0802 by Lee Encinas RN  Outcome: Ongoing     Problem: Mood - Altered:  Goal: Mood stable  Description: Mood stable  4/17/2021 2045 by Kenneth Amador RN  Outcome: Ongoing  4/17/2021 0802 by Lee Encinas RN  Outcome: Ongoing     Problem: Pain - Acute:  Goal: Pain level will decrease  Description: Pain level will decrease  4/17/2021 2045 by Kenneth Amador RN  Outcome: Ongoing  4/17/2021 0802 by Lee Encinas RN  Outcome: Ongoing

## 2021-04-18 NOTE — PROGRESS NOTES
Department of Obstetrics and Gynecology  Labor and Delivery  Attending Post Partum Progress Note      SUBJECTIVE:  Pt without complaints, lochia=menses, adequate pain control    OBJECTIVE:      Vitals:  /83   Pulse 88   Temp 98.1 °F (36.7 °C) (Oral)   Resp 16   Ht 5' 1\" (1.549 m)   Wt 208 lb (94.3 kg)   Breastfeeding Unknown   BMI 39.30 kg/m²     ABDOMEN:  No scars, normal bowel sounds, soft, non-distended, non-tender, no masses palpated, no hepatosplenomegally  GENITAL/URINARY:  deferred    DATA:    CBC:    Lab Results   Component Value Date    WBC 10.1 2021    RBC 3.52 2021    HGB 10.7 2021    HCT 32.6 2021    MCV 92.5 2021    RDW 16.0 2021     2021       ASSESSMENT & PLAN:    A: PPD #2 s/p     P: d/c home, precuations given, f/u in 6 weeks

## 2021-04-18 NOTE — LACTATION NOTE
This note was copied from a baby's chart. Lactation Progress Note      Data:   F/U with multip 2PP with breastfeeding and lactation rounds. MOB states that her milk is coming in, and breast feel full. States that infant is latching to both breast well. Has some questions in regards to engorgement. Infant output 7 urine/ 4 stool, weight loss @7.3%    Action: Introduced self to patient as LC for the day. Name and number placed on whiteboard. BF education reviewed with patient and what to expect over then next 1-2 weeks with mature milk production, infant feedings, infant output, breast care, engorgement prevention, pacifier, bottle use, and pumping information. Discharge binder also reviewed with patient with f/u care and resources provided. Observed infant latch at breast on left side in cradle hold. MOB's breast are full. LC demonstrated breast massage and encouraged warm compresses as needed to help with engorgement to soften nipple, and ice after bf as needed for 10 minutes to help decrease inflammation. Infant noted with several audible swallows at breast, and continues after consult. All questions answered at this time. RN updated with education that was provided to patient. Patient instructed to call for f/u care as needed. Response: MOB verbalizes understanding of breastfeeding education that was provided. Feels comfortable with breastfeeding at this time. Will call for f/u care as needed during her stay, and with outpatient services.

## 2021-04-18 NOTE — PROGRESS NOTES
ID bands checked. Mother's ID band and one of baby's ID bands removed and taped to discharge instruction sheet, signed by patient and witnessed by RN. Discharged in wheelchair, holding baby in car seat on lap. Patient discharged in stable condition accompanied by fob. Patient verbalizes understanding of discharge instructions and denies further questions.  Prescriptions called into pharmacy

## 2021-05-08 ENCOUNTER — HOSPITAL ENCOUNTER (EMERGENCY)
Age: 31
Discharge: HOME | End: 2021-05-08
Payer: COMMERCIAL

## 2021-05-08 VITALS
OXYGEN SATURATION: 99 % | SYSTOLIC BLOOD PRESSURE: 121 MMHG | HEART RATE: 86 BPM | HEIGHT: 61 IN | DIASTOLIC BLOOD PRESSURE: 70 MMHG | BODY MASS INDEX: 34.93 KG/M2 | RESPIRATION RATE: 16 BRPM | TEMPERATURE: 98.9 F | WEIGHT: 185 LBS

## 2021-05-08 DIAGNOSIS — N61.0 MASTITIS: Primary | ICD-10-CM

## 2021-05-08 LAB
A/G RATIO: 1.2 (ref 1.1–2.2)
ALBUMIN SERPL-MCNC: 3.8 G/DL (ref 3.4–5)
ALP BLD-CCNC: 90 U/L (ref 40–129)
ALT SERPL-CCNC: 32 U/L (ref 10–40)
ANION GAP SERPL CALCULATED.3IONS-SCNC: 17 MMOL/L (ref 3–16)
AST SERPL-CCNC: 18 U/L (ref 15–37)
BASOPHILS ABSOLUTE: 0 K/UL (ref 0–0.2)
BASOPHILS RELATIVE PERCENT: 0.3 %
BILIRUB SERPL-MCNC: <0.2 MG/DL (ref 0–1)
BILIRUBIN URINE: NEGATIVE
BLOOD, URINE: ABNORMAL
BUN BLDV-MCNC: 12 MG/DL (ref 7–20)
CALCIUM SERPL-MCNC: 9.4 MG/DL (ref 8.3–10.6)
CHLORIDE BLD-SCNC: 104 MMOL/L (ref 99–110)
CLARITY: CLEAR
CO2: 18 MMOL/L (ref 21–32)
COLOR: YELLOW
CREAT SERPL-MCNC: 0.7 MG/DL (ref 0.6–1.1)
EOSINOPHILS ABSOLUTE: 0.3 K/UL (ref 0–0.6)
EOSINOPHILS RELATIVE PERCENT: 3 %
EPITHELIAL CELLS, UA: ABNORMAL /HPF (ref 0–5)
GFR AFRICAN AMERICAN: >60
GFR NON-AFRICAN AMERICAN: >60
GLOBULIN: 3.1 G/DL
GLUCOSE BLD-MCNC: 83 MG/DL (ref 70–99)
GLUCOSE URINE: NEGATIVE MG/DL
HCT VFR BLD CALC: 38.4 % (ref 36–48)
HEMOGLOBIN: 12.7 G/DL (ref 12–16)
KETONES, URINE: 40 MG/DL
LEUKOCYTE ESTERASE, URINE: ABNORMAL
LYMPHOCYTES ABSOLUTE: 1.2 K/UL (ref 1–5.1)
LYMPHOCYTES RELATIVE PERCENT: 12.3 %
MCH RBC QN AUTO: 29.9 PG (ref 26–34)
MCHC RBC AUTO-ENTMCNC: 33.1 G/DL (ref 31–36)
MCV RBC AUTO: 90.4 FL (ref 80–100)
MICROSCOPIC EXAMINATION: YES
MONOCYTES ABSOLUTE: 0.5 K/UL (ref 0–1.3)
MONOCYTES RELATIVE PERCENT: 5 %
MUCUS: ABNORMAL /LPF
NEUTROPHILS ABSOLUTE: 7.5 K/UL (ref 1.7–7.7)
NEUTROPHILS RELATIVE PERCENT: 79.4 %
NITRITE, URINE: NEGATIVE
PDW BLD-RTO: 14.9 % (ref 12.4–15.4)
PH UA: 5.5 (ref 5–8)
PLATELET # BLD: 406 K/UL (ref 135–450)
PMV BLD AUTO: 7.7 FL (ref 5–10.5)
POTASSIUM SERPL-SCNC: 3.6 MMOL/L (ref 3.5–5.1)
PROTEIN UA: NEGATIVE MG/DL
RBC # BLD: 4.25 M/UL (ref 4–5.2)
RBC UA: ABNORMAL /HPF (ref 0–4)
SODIUM BLD-SCNC: 139 MMOL/L (ref 136–145)
SPECIFIC GRAVITY UA: 1.02 (ref 1–1.03)
SPECIMEN STATUS: NORMAL
TOTAL PROTEIN: 6.9 G/DL (ref 6.4–8.2)
URINE TYPE: ABNORMAL
UROBILINOGEN, URINE: 0.2 E.U./DL
WBC # BLD: 9.4 K/UL (ref 4–11)
WBC UA: ABNORMAL /HPF (ref 0–5)

## 2021-05-08 PROCEDURE — 6370000000 HC RX 637 (ALT 250 FOR IP): Performed by: PHYSICIAN ASSISTANT

## 2021-05-08 PROCEDURE — 85025 COMPLETE CBC W/AUTO DIFF WBC: CPT

## 2021-05-08 PROCEDURE — 80053 COMPREHEN METABOLIC PANEL: CPT

## 2021-05-08 PROCEDURE — 99284 EMERGENCY DEPT VISIT MOD MDM: CPT

## 2021-05-08 PROCEDURE — 81001 URINALYSIS AUTO W/SCOPE: CPT

## 2021-05-08 RX ORDER — ACETAMINOPHEN 325 MG/1
650 TABLET ORAL ONCE
Status: COMPLETED | OUTPATIENT
Start: 2021-05-08 | End: 2021-05-08

## 2021-05-08 RX ORDER — CLINDAMYCIN HYDROCHLORIDE 150 MG/1
450 CAPSULE ORAL ONCE
Status: COMPLETED | OUTPATIENT
Start: 2021-05-08 | End: 2021-05-08

## 2021-05-08 RX ORDER — CLINDAMYCIN HYDROCHLORIDE 150 MG/1
450 CAPSULE ORAL 3 TIMES DAILY
Qty: 90 CAPSULE | Refills: 0 | Status: SHIPPED | OUTPATIENT
Start: 2021-05-08 | End: 2021-05-18

## 2021-05-08 RX ORDER — CEPHALEXIN 500 MG/1
500 CAPSULE ORAL 4 TIMES DAILY
COMMUNITY

## 2021-05-08 RX ORDER — CEPHALEXIN 250 MG/1
500 CAPSULE ORAL ONCE
Status: COMPLETED | OUTPATIENT
Start: 2021-05-08 | End: 2021-05-08

## 2021-05-08 RX ADMIN — CLINDAMYCIN HYDROCHLORIDE 450 MG: 150 CAPSULE ORAL at 21:04

## 2021-05-08 RX ADMIN — CEPHALEXIN 500 MG: 250 CAPSULE ORAL at 21:04

## 2021-05-08 RX ADMIN — ACETAMINOPHEN 650 MG: 325 TABLET ORAL at 21:04

## 2021-05-08 ASSESSMENT — PAIN SCALES - GENERAL
PAINLEVEL_OUTOF10: 6
PAINLEVEL_OUTOF10: 7
PAINLEVEL_OUTOF10: 8

## 2021-05-08 ASSESSMENT — PAIN DESCRIPTION - ORIENTATION: ORIENTATION: RIGHT

## 2021-05-08 ASSESSMENT — PAIN DESCRIPTION - PAIN TYPE
TYPE: ACUTE PAIN
TYPE: ACUTE PAIN

## 2021-05-08 ASSESSMENT — PAIN DESCRIPTION - LOCATION
LOCATION: BREAST;HEAD
LOCATION: BREAST

## 2021-05-08 NOTE — ED NOTES
Pt reports right breast pain since Wednesday. Prescribed Keflex by provider for possible mastitis. Pt states symptoms no better since starting antibiotics. Pain now radiating from right breast, towards neck.       Ana Pena RN  05/08/21 3136

## 2021-05-08 NOTE — ED PROVIDER NOTES
[address] [facility]    CHIEF COMPLAINT  Breast Pain (concern for mastitis. placed on antibiotics on Wednesday Keflex. sx getting worse) and Fever      SHARED SERVICE VISIT  Evaluated by DONATO. My supervising physician was available for consultation. HISTORY OF PRESENT Renetta Flores is a 32 y.o. female who presents to the ED complaining of right-sided breast pain. She states she was diagnosed with mastitis via a phone conversation with her OBGYN. She was started on Keflex on Wednesday and her symptoms initially did improve, however they returned as of yesterday. She states she is having 7/10 pain in her right breast.  She has been continuing to breast-feed and pump using both breasts and has been using warm compresses at home. She states she has been running a fever with the highest being 103. She did have a fever earlier today at 100.4, but does not currently have a fever in the emergency department. She states she is also been having flulike symptoms which she just associated with the mastitis. She is also been experiencing sharp pains shooting up her neck into her head which last only momentarily and then will subside. She states the pains do improve with Tylenol or ibuprofen. No other complaints, modifying factors or associated symptoms. Nursing notes reviewed.    Past Medical History:   Diagnosis Date    Anxiety     Depression     Hyperlipidemia     Infertility, female     iui    PCOS (polycystic ovarian syndrome)      Past Surgical History:   Procedure Laterality Date    ADENOIDECTOMY      had them removed twice as they grew back at age 16    BACK SURGERY      x 2    Donny Forno 76      as a child     Family History   Problem Relation Age of Onset    High Blood Pressure Mother    Rivera Buchanan Migraines Mother     Depression Mother     COPD Mother     Depression Sister     Glaucoma Maternal Aunt     High Blood Pressure Maternal Grandmother     High Cholesterol Maternal Grandmother     Stroke Maternal Grandmother     Kidney Disease Maternal Grandmother     Arthritis Maternal Grandmother     High Blood Pressure Maternal Grandfather     High Cholesterol Maternal Grandfather     Stroke Maternal Grandfather     Diabetes Maternal Grandfather     Arthritis Maternal Grandfather     Glaucoma Maternal Grandfather      Social History     Socioeconomic History    Marital status:      Spouse name: Not on file    Number of children: Not on file    Years of education: Not on file    Highest education level: Not on file   Occupational History    Not on file   Social Needs    Financial resource strain: Not on file    Food insecurity     Worry: Not on file     Inability: Not on file   [employer] needs     Medical: Not on file     Non-medical: Not on file   Tobacco Use    Smoking status: Former Smoker    Smokeless tobacco: Never Used   Substance and Sexual Activity    Alcohol use: No    Drug use: Never    Sexual activity: Not on file   Lifestyle    Physical activity     Days per week: Not on file     Minutes per session: Not on file    Stress: Not on file   Relationships    Social connections     Talks on phone: Not on file     Gets together: Not on file     Attends Taoist service: Not on file     Active member of club or organization: Not on file     Attends meetings of clubs or organizations: Not on file     Relationship status: Not on file    Intimate partner violence     Fear of current or ex partner: Not on file     Emotionally abused: Not on file     Physically abused: Not on file     Forced sexual activity: Not on file   Other Topics Concern    Not on file   Social History Narrative    Not on file     No current facility-administered medications for this encounter.       Current Outpatient Medications   Medication Sig Dispense Refill    cephALEXin (KEFLEX) 500 MG capsule Take 500 mg by mouth 4 times daily  ibuprofen (ADVIL;MOTRIN) 800 MG tablet Take 1 tablet by mouth every 6 hours as needed (Pain level 1 - 10) 120 tablet 3    Prenatal Vit-Fe Fumarate-FA (PNV PRENATAL PLUS MULTIVITAMIN) 27-1 MG TABS        Allergies   Allergen Reactions    Penicillins      Unsure of reaction to pcn    Peanut-Containing Drug Products Rash     03/20/2017 -  02/09/2017 -  11/09/2016 -  09/12/2016 -         REVIEW OF SYSTEMS  10 systems reviewed, pertinent positives per HPI otherwise noted to be negative    PHYSICAL EXAM  /76   Pulse 105   Temp 98.9 °F (37.2 °C) (Oral)   Resp 18   Ht 5' 1\" (1.549 m)   Wt 185 lb (83.9 kg)   SpO2 99%   BMI 34.96 kg/m²   GENERAL APPEARANCE: Awake and alert. Cooperative. No acute distress. HEAD: Normocephalic. Atraumatic. EYES: PERRL. EOM's grossly intact. ENT: Mucous membranes are moist.   NECK: Supple. HEART: RRR. No murmurs. LUNGS: Respirations unlabored. CTAB. Good air exchange. Speaking comfortably in full sentences. ABDOMEN: Soft. Non-distended. Non-tender. No guarding or rebound. No masses. No organomegaly. EXTREMITIES: No peripheral edema. Moves all extremities equally. All extremities neurovascularly intact. SKIN: Warm and dry. No acute rashes. Right breast with mild swelling and redness when compared to left. No significant abscess palpable  NEUROLOGICAL: Alert and oriented. CN's 2-12 intact. No gross facial drooping. Strength 5/5, sensation intact. PSYCHIATRIC: Normal mood and affect. RADIOLOGY  No results found. ED COURSE  Patient did not require anything for pain initially in the emergency department. Triage vitals stable, mild tachycardia at 105, but is afebrile. Physical exam reveals mild tenderness of the right breast, slight erythema, no palpable abscess. Lab results are reassuring with no leukocytosis. I did consult OB/GYN and spoke with Dr. Latosha Lowe regarding the patient case.   She is well aware of the patient's symptoms and was concerned she (83.9 kg), SpO2 99 %, unknown if currently breastfeeding. DISPOSITION  Patient was discharged to home in good condition.           [place], [place]  05/08/21 5881

## 2021-05-09 NOTE — ED NOTES
[facility] @ 2036  RE: Mastitis per Jose Arenas called back @ 2040       Metangela Patelu  05/08/21 2042

## 2021-05-09 NOTE — ED PROVIDER NOTES
I independently examined and evaluated Oma Soria. All diagnostic, treatment, and disposition decisions were made by myself in conjunction with the DONATO, [employer]. For all further details of the patient's emergency department visit, please see their documentation. 32 yr old female 3 weeks post partum with R breat pain for 3 days She has been taking Keflex per her OBGYN, but is not improving. It is very painful to nurse or pump. She is having fever at home and a sharp pain that shoots up her R posterior neck. On exam she has an engorged R breast with erythema, tenderness to inferior portion, most tender around the 6-9 o'clock position. No clear abscess identified on clinical exam. Afebrile here, labs with no leukocytosis. I have  Low suspicion for a breast abscess. She does not meet SIRS criiteria. Case discussed by Brody VICTORIA with the on call OBGYN who agrees with plan for adding Clinda to Keflex and prompt follow up.      Mel Stiles MD  05/09/21 7568

## 2022-02-01 ENCOUNTER — HOSPITAL ENCOUNTER (OUTPATIENT)
Dept: GENERAL RADIOLOGY | Age: 32
Discharge: HOME | End: 2022-02-01
Payer: COMMERCIAL

## 2022-02-01 ENCOUNTER — HOSPITAL ENCOUNTER (OUTPATIENT)
Age: 32
Discharge: HOME | End: 2022-02-01
Payer: COMMERCIAL

## 2022-02-01 DIAGNOSIS — M54.9 PAIN, UPPER BACK: ICD-10-CM

## 2022-02-01 PROCEDURE — 74018 RADEX ABDOMEN 1 VIEW: CPT

## 2023-05-02 ENCOUNTER — APPOINTMENT (OUTPATIENT)
Dept: GENERAL RADIOLOGY | Age: 33
End: 2023-05-02
Payer: COMMERCIAL

## 2023-05-02 ENCOUNTER — HOSPITAL ENCOUNTER (EMERGENCY)
Age: 33
Discharge: HOME | End: 2023-05-02
Attending: EMERGENCY MEDICINE
Payer: COMMERCIAL

## 2023-05-02 VITALS
TEMPERATURE: 98.3 F | HEART RATE: 82 BPM | RESPIRATION RATE: 14 BRPM | SYSTOLIC BLOOD PRESSURE: 130 MMHG | WEIGHT: 190 LBS | BODY MASS INDEX: 35.87 KG/M2 | OXYGEN SATURATION: 100 % | HEIGHT: 61 IN | DIASTOLIC BLOOD PRESSURE: 83 MMHG

## 2023-05-02 DIAGNOSIS — S92.355A NONDISPLACED FRACTURE OF FIFTH METATARSAL BONE, LEFT FOOT, INITIAL ENCOUNTER FOR CLOSED FRACTURE: Primary | ICD-10-CM

## 2023-05-02 PROCEDURE — 6370000000 HC RX 637 (ALT 250 FOR IP): Performed by: EMERGENCY MEDICINE

## 2023-05-02 PROCEDURE — 29515 APPLICATION SHORT LEG SPLINT: CPT

## 2023-05-02 PROCEDURE — 99283 EMERGENCY DEPT VISIT LOW MDM: CPT

## 2023-05-02 PROCEDURE — 73630 X-RAY EXAM OF FOOT: CPT

## 2023-05-02 RX ORDER — DEXTROAMPHETAMINE SACCHARATE, AMPHETAMINE ASPARTATE MONOHYDRATE, DEXTROAMPHETAMINE SULFATE AND AMPHETAMINE SULFATE 6.25; 6.25; 6.25; 6.25 MG/1; MG/1; MG/1; MG/1
1 CAPSULE, EXTENDED RELEASE ORAL DAILY
COMMUNITY
Start: 2023-04-12

## 2023-05-02 RX ORDER — IBUPROFEN 600 MG/1
600 TABLET ORAL ONCE
Status: COMPLETED | OUTPATIENT
Start: 2023-05-02 | End: 2023-05-02

## 2023-05-02 RX ORDER — SERTRALINE HYDROCHLORIDE 100 MG/1
1 TABLET, FILM COATED ORAL DAILY
COMMUNITY
Start: 2023-03-19

## 2023-05-02 RX ORDER — ONDANSETRON 4 MG/1
8 TABLET, ORALLY DISINTEGRATING ORAL ONCE
Status: COMPLETED | OUTPATIENT
Start: 2023-05-02 | End: 2023-05-02

## 2023-05-02 RX ORDER — ACETAMINOPHEN 500 MG
1000 TABLET ORAL ONCE
Status: COMPLETED | OUTPATIENT
Start: 2023-05-02 | End: 2023-05-02

## 2023-05-02 RX ADMIN — ONDANSETRON 8 MG: 4 TABLET, ORALLY DISINTEGRATING ORAL at 09:08

## 2023-05-02 RX ADMIN — IBUPROFEN 600 MG: 600 TABLET, FILM COATED ORAL at 09:08

## 2023-05-02 RX ADMIN — ACETAMINOPHEN 1000 MG: 500 TABLET ORAL at 09:08

## 2023-05-02 ASSESSMENT — LIFESTYLE VARIABLES: HOW OFTEN DO YOU HAVE A DRINK CONTAINING ALCOHOL: 2-4 TIMES A MONTH

## 2023-05-02 ASSESSMENT — PAIN - FUNCTIONAL ASSESSMENT
PAIN_FUNCTIONAL_ASSESSMENT: PREVENTS OR INTERFERES WITH MANY ACTIVE NOT PASSIVE ACTIVITIES
PAIN_FUNCTIONAL_ASSESSMENT: 0-10

## 2023-05-02 ASSESSMENT — PAIN DESCRIPTION - ONSET: ONSET: SUDDEN

## 2023-05-02 ASSESSMENT — PAIN DESCRIPTION - LOCATION: LOCATION: FOOT

## 2023-05-02 ASSESSMENT — PAIN DESCRIPTION - ORIENTATION: ORIENTATION: LEFT;OUTER

## 2023-05-02 ASSESSMENT — PAIN DESCRIPTION - FREQUENCY: FREQUENCY: CONTINUOUS

## 2023-05-02 ASSESSMENT — PAIN DESCRIPTION - PAIN TYPE: TYPE: ACUTE PAIN

## 2023-05-02 ASSESSMENT — PAIN SCALES - GENERAL: PAINLEVEL_OUTOF10: 8

## 2023-05-02 NOTE — ED PROVIDER NOTES
by mouth daily       ALLERGIES     Penicillins and Peanut-containing drug products    FAMILY HISTORY       Family History   Problem Relation Age of Onset    High Blood Pressure Mother     Migraines Mother     Depression Mother     COPD Mother     Depression Sister     Glaucoma Maternal Aunt     High Blood Pressure Maternal Grandmother     High Cholesterol Maternal Grandmother     Stroke Maternal Grandmother     Kidney Disease Maternal Grandmother     Arthritis Maternal Grandmother     High Blood Pressure Maternal Grandfather     High Cholesterol Maternal Grandfather     Stroke Maternal Grandfather     Diabetes Maternal Grandfather     Arthritis Maternal Grandfather     Glaucoma Maternal Grandfather           SOCIAL HISTORY       Social History     Socioeconomic History    Marital status:      Spouse name: None    Number of children: None    Years of education: None    Highest education level: None   Tobacco Use    Smoking status: Former    Smokeless tobacco: Never   Vaping Use    Vaping Use: Never used   Substance and Sexual Activity    Alcohol use: No    Drug use: Never       SCREENINGS    Jose Raul Coma Scale  Eye Opening: Spontaneous  Best Verbal Response: Oriented  Best Motor Response: Obeys commands  [place] Coma Scale Score: 15          PHYSICAL EXAM    (up to 7 for level 4, 8 or more for level 5)     ED Triage Vitals [05/02/23 0844]   BP Temp Temp Source Heart Rate Resp SpO2 Height Weight   130/83 98.3 °F (36.8 °C) Oral 82 14 100 % 5' 1\" (1.549 m) 190 lb (86.2 kg)       Physical Exam  Vitals and nursing note reviewed. Constitutional:       Appearance: Normal appearance. HENT:      Head: Normocephalic. Cardiovascular:      Rate and Rhythm: Normal rate. Musculoskeletal:         General: Swelling, tenderness and signs of injury present. Neurological:      Mental Status: She is alert.        DIAGNOSTIC RESULTS     EKG: All EKG's are interpreted by the Emergency Department Physician who either signs

## 2023-05-02 NOTE — ED NOTES
AVS provided and reviewed with the patient. The patient verbalized understanding of care at home, follow up care, and emergent symptoms to return for. No questions or concerns verbalized at this time. The patient is alert, oriented, stable, and ambulatory out of the department at the time of discharge.        Jeanne Ferguson RN  05/02/23 9456

## 2023-05-02 NOTE — DISCHARGE INSTRUCTIONS
No ambulation till seen  Take acetaminophen 650 mg every 4 hours  Take ibuprofen 600 mg every 6-8 hours with food  Keep it elevated today is much as possible  Ice bag 20 minutes every 3-4 hours while awake for the next 2 days  Call the orthopedic team today to be seen in the next 3 days

## 2023-05-03 ENCOUNTER — OFFICE VISIT (OUTPATIENT)
Dept: ORTHOPEDIC SURGERY | Age: 33
End: 2023-05-03

## 2023-05-03 VITALS — BODY MASS INDEX: 35.87 KG/M2 | HEIGHT: 61 IN | WEIGHT: 190 LBS

## 2023-05-03 DIAGNOSIS — S92.355A NONDISPLACED FRACTURE OF FIFTH METATARSAL BONE, LEFT FOOT, INITIAL ENCOUNTER FOR CLOSED FRACTURE: Primary | ICD-10-CM

## 2023-05-03 RX ORDER — HYDROCODONE BITARTRATE AND ACETAMINOPHEN 5; 325 MG/1; MG/1
1 TABLET ORAL EVERY 6 HOURS PRN
Qty: 20 TABLET | Refills: 0 | Status: SHIPPED | OUTPATIENT
Start: 2023-05-03 | End: 2023-05-08

## 2023-05-03 NOTE — PROGRESS NOTES
ORTHOPAEDIC SURGERY INITIAL EVALUATION NOTE  Chief Complaint   Patient presents with    Foot Pain     L Foot Pn      HISTORY OF PRESENT ILLNESS:  29-year-old female presents for evaluation of a left foot injury. She indicates that she rolled her left foot. She describes an inversion type injury. She reports having significant pain and swelling. She presented to the emergency department where she was placed into a splint and provided crutches. She she rates her pain an 8 out of 10. It is primarily over the lateral border of the foot. It does not radiate. She denies numbness. It is just sharp pain. She has been attempting to manage this with Tylenol/ibuprofen which is ineffective. Past Medical History:   Diagnosis Date    Anxiety     Depression     Hyperlipidemia     Infertility, female     iui    PCOS (polycystic ovarian syndrome)        Current Outpatient Medications   Medication Sig Dispense Refill    cariprazine hcl (VRAYLAR) 1.5 MG capsule Take 1 capsule by mouth daily      amphetamine-dextroamphetamine (ADDERALL XR) 25 MG extended release capsule Take 1 capsule by mouth daily. Max Daily Amount: 1 capsule      sertraline (ZOLOFT) 100 MG tablet Take 1 tablet by mouth daily       No current facility-administered medications for this visit.         Past Surgical History:   Procedure Laterality Date    ADENOIDECTOMY      had them removed twice as they grew back at age 15    BACK SURGERY      x 2     [address]      as a child       Allergies   Allergen Reactions    Penicillins      Unsure of reaction to pcn    Peanut-Containing Drug Products Rash     03/20/2017 -  02/09/2017 -  11/09/2016 -  09/12/2016 -         Family History   Problem Relation Age of Onset    High Blood Pressure Mother     Migraines Mother     Depression Mother     COPD Mother     Depression Sister     Glaucoma Maternal Aunt     High Blood Pressure Maternal Grandmother     High Cholesterol Maternal Grandmother

## 2023-05-10 ENCOUNTER — TELEPHONE (OUTPATIENT)
Dept: ORTHOPEDIC SURGERY | Age: 33
End: 2023-05-10

## 2023-05-10 NOTE — TELEPHONE ENCOUNTER
Spoke with patient. Informed her some numbness and tingling is normal after a fracture. If she is still experiencing this in 1 week she is to call back and we will re-evaluate.

## 2023-05-10 NOTE — TELEPHONE ENCOUNTER
General Question     Subject: IS IT NORMAL FOR THE TOES TO FEEL NUMB AND TINGLY AFTER HER BROKE FOOT.   Patient: Guera Patino  Contact Number: [phone]

## 2023-05-15 ENCOUNTER — TELEPHONE (OUTPATIENT)
Dept: ORTHOPEDIC SURGERY | Age: 33
End: 2023-05-15

## 2023-05-16 NOTE — TELEPHONE ENCOUNTER
2 weeks post injury for nonoperatively managed fracture. Should transition to tylenol/ibuprofen at this point. Schedule with physical. If pain worsening, or does not resolve after completion of recommended therapy, let me know and we can discuss getting an MRI    Start taking diclofenac twice daily. Do not take ibuprofen or other over the counter NSAIDs while taking this medication  Can also take tylenol in between doses

## 2023-05-16 NOTE — TELEPHONE ENCOUNTER
Left voicemail. Informed patient that we do not refill pain medications at this point for non-op fractures, she should be able to manage with ibuprofen or tylenol. She is scheduled for a follow up tomorrow morning and we can speak more then.

## 2023-05-17 ENCOUNTER — OFFICE VISIT (OUTPATIENT)
Dept: ORTHOPEDIC SURGERY | Age: 33
End: 2023-05-17
Payer: COMMERCIAL

## 2023-05-17 VITALS — WEIGHT: 190 LBS | BODY MASS INDEX: 35.87 KG/M2 | HEIGHT: 61 IN

## 2023-05-17 DIAGNOSIS — M79.672 LEFT FOOT PAIN: Primary | ICD-10-CM

## 2023-05-17 PROCEDURE — 99213 OFFICE O/P EST LOW 20 MIN: CPT | Performed by: ORTHOPAEDIC SURGERY

## 2023-05-17 RX ORDER — HYDROCODONE BITARTRATE AND ACETAMINOPHEN 5; 325 MG/1; MG/1
1 TABLET ORAL EVERY 6 HOURS PRN
Qty: 20 TABLET | Refills: 0 | Status: SHIPPED | OUTPATIENT
Start: 2023-05-17 | End: 2023-05-22

## 2023-05-17 NOTE — PROGRESS NOTES
ORTHOPAEDIC SURGERY FOLLOWUP VISIT    CHIEF COMPLAINT: left foot pain    HISTORY OF PRESENT ILLNESS:  12-year-old female presents for repeat evaluation of left foot fracture. Overall, her pain is improving. She rates her pain 4 out of 10. Is an achy pain over the lateral foot. She has been ambulatory with the cam boot. She is not using crutches. Most of her pain is at the end of the day after activities. PHYSICAL EXAM:  Left foot:  There is mild swelling in this area. Ankle range of motion is neutral to 50 degrees plantarflexion without difficulty. There is tenderness to palpation along the course of the peroneal tendons. No tenderness to palpation about the medial ankle or foot. No obvious deformity. Sensation is intact to light touch in deep peroneal, superficial peroneal, tibial, sural, and saphenous nerve distributions. Motor function is intact to EHL, FHL, tibialis anterior, and gastroc. There is brisk capillary refill to the toes and a strong palpable dorsalis pedis pulse. Compartments are soft and compressible. There is no calf tenderness and a negative Homans' sign    RADIOGRAPHIC EXAM:  3 views of the left foot including PA, oblique, lateral projections demonstrate healing fracture of the fifth metatarsal base. There is periosteal callus. No change in alignment. ASSESSMENT AND PLAN:  2 weeks status post nonoperatively managed left fifth metatarsal base fracture    Healing uneventfully  Weightbearing as tolerated left lower extremity in cam boot  Pain control  May remove boot for ankle range of motion exercises and hygiene purposes. Work note provided indicating she should have intermittent rest breaks in order to allow ice elevation of the foot and also relative rest.  May wean from boot in 2 weeks. Return to clinic in 4 weeks for repeat x-rays.     Veronica Cornejo MD